# Patient Record
Sex: FEMALE | Race: BLACK OR AFRICAN AMERICAN | NOT HISPANIC OR LATINO | Employment: UNEMPLOYED | URBAN - METROPOLITAN AREA
[De-identification: names, ages, dates, MRNs, and addresses within clinical notes are randomized per-mention and may not be internally consistent; named-entity substitution may affect disease eponyms.]

---

## 2022-12-16 ENCOUNTER — HOSPITAL ENCOUNTER (EMERGENCY)
Facility: CLINIC | Age: 32
Discharge: HOME | End: 2022-12-16
Attending: INTERNAL MEDICINE | Admitting: INTERNAL MEDICINE
Payer: COMMERCIAL

## 2022-12-16 VITALS
HEART RATE: 73 BPM | RESPIRATION RATE: 16 BRPM | SYSTOLIC BLOOD PRESSURE: 105 MMHG | OXYGEN SATURATION: 99 % | WEIGHT: 133.8 LBS | DIASTOLIC BLOOD PRESSURE: 75 MMHG | TEMPERATURE: 98.1 F

## 2022-12-16 DIAGNOSIS — O26.91 COMPLICATION OF PREGNANCY, ANTEPARTUM, FIRST TRIMESTER: ICD-10-CM

## 2022-12-16 DIAGNOSIS — R10.84 ABDOMINAL PAIN, GENERALIZED: ICD-10-CM

## 2022-12-16 LAB
ALBUMIN SERPL-MCNC: 3.8 G/DL (ref 3.4–5)
ALBUMIN UR-MCNC: NEGATIVE MG/DL
ALP SERPL-CCNC: 58 U/L (ref 40–150)
ALT SERPL W P-5'-P-CCNC: 16 U/L (ref 0–50)
ANION GAP SERPL CALCULATED.3IONS-SCNC: 6 MMOL/L (ref 3–14)
APPEARANCE UR: CLEAR
AST SERPL W P-5'-P-CCNC: 10 U/L (ref 0–45)
BACTERIA #/AREA URNS HPF: ABNORMAL /HPF
BASOPHILS # BLD AUTO: 0 10E3/UL (ref 0–0.2)
BASOPHILS NFR BLD AUTO: 1 %
BILIRUB SERPL-MCNC: 0.4 MG/DL (ref 0.2–1.3)
BILIRUB UR QL STRIP: NEGATIVE
BUN SERPL-MCNC: 5 MG/DL (ref 7–30)
CALCIUM SERPL-MCNC: 8.9 MG/DL (ref 8.5–10.1)
CHLORIDE BLD-SCNC: 108 MMOL/L (ref 94–109)
CO2 SERPL-SCNC: 23 MMOL/L (ref 20–32)
COLOR UR AUTO: ABNORMAL
CREAT SERPL-MCNC: 0.53 MG/DL (ref 0.52–1.04)
EOSINOPHIL # BLD AUTO: 0 10E3/UL (ref 0–0.7)
EOSINOPHIL NFR BLD AUTO: 1 %
ERYTHROCYTE [DISTWIDTH] IN BLOOD BY AUTOMATED COUNT: 11.9 % (ref 10–15)
GFR SERPL CREATININE-BSD FRML MDRD: >90 ML/MIN/1.73M2
GLUCOSE BLD-MCNC: 92 MG/DL (ref 70–99)
GLUCOSE UR STRIP-MCNC: NEGATIVE MG/DL
HCT VFR BLD AUTO: 40.6 % (ref 35–47)
HGB BLD-MCNC: 13.8 G/DL (ref 11.7–15.7)
HGB UR QL STRIP: NEGATIVE
HOLD SPECIMEN: NORMAL
HOLD SPECIMEN: NORMAL
IMM GRANULOCYTES # BLD: 0 10E3/UL
IMM GRANULOCYTES NFR BLD: 0 %
KETONES UR STRIP-MCNC: NEGATIVE MG/DL
LEUKOCYTE ESTERASE UR QL STRIP: NEGATIVE
LYMPHOCYTES # BLD AUTO: 2.1 10E3/UL (ref 0.8–5.3)
LYMPHOCYTES NFR BLD AUTO: 38 %
MAGNESIUM SERPL-MCNC: 2.2 MG/DL (ref 1.6–2.3)
MCH RBC QN AUTO: 31 PG (ref 26.5–33)
MCHC RBC AUTO-ENTMCNC: 34 G/DL (ref 31.5–36.5)
MCV RBC AUTO: 91 FL (ref 78–100)
MONOCYTES # BLD AUTO: 0.4 10E3/UL (ref 0–1.3)
MONOCYTES NFR BLD AUTO: 7 %
MUCOUS THREADS #/AREA URNS LPF: PRESENT /LPF
NEUTROPHILS # BLD AUTO: 3.1 10E3/UL (ref 1.6–8.3)
NEUTROPHILS NFR BLD AUTO: 53 %
NITRATE UR QL: NEGATIVE
NRBC # BLD AUTO: 0 10E3/UL
NRBC BLD AUTO-RTO: 0 /100
PH UR STRIP: 6 [PH] (ref 5–7)
PLATELET # BLD AUTO: 253 10E3/UL (ref 150–450)
POTASSIUM BLD-SCNC: 3.5 MMOL/L (ref 3.4–5.3)
PROT SERPL-MCNC: 7.5 G/DL (ref 6.8–8.8)
RBC # BLD AUTO: 4.45 10E6/UL (ref 3.8–5.2)
RBC URINE: 1 /HPF
SODIUM SERPL-SCNC: 137 MMOL/L (ref 133–144)
SP GR UR STRIP: 1.01 (ref 1–1.03)
SQUAMOUS EPITHELIAL: <1 /HPF
UROBILINOGEN UR STRIP-MCNC: NORMAL MG/DL
WBC # BLD AUTO: 5.6 10E3/UL (ref 4–11)
WBC URINE: 1 /HPF

## 2022-12-16 PROCEDURE — 96361 HYDRATE IV INFUSION ADD-ON: CPT | Performed by: INTERNAL MEDICINE

## 2022-12-16 PROCEDURE — 99284 EMERGENCY DEPT VISIT MOD MDM: CPT | Mod: 25 | Performed by: INTERNAL MEDICINE

## 2022-12-16 PROCEDURE — 36415 COLL VENOUS BLD VENIPUNCTURE: CPT | Performed by: INTERNAL MEDICINE

## 2022-12-16 PROCEDURE — 81001 URINALYSIS AUTO W/SCOPE: CPT | Performed by: INTERNAL MEDICINE

## 2022-12-16 PROCEDURE — 258N000003 HC RX IP 258 OP 636: Performed by: INTERNAL MEDICINE

## 2022-12-16 PROCEDURE — 80053 COMPREHEN METABOLIC PANEL: CPT | Performed by: INTERNAL MEDICINE

## 2022-12-16 PROCEDURE — 96360 HYDRATION IV INFUSION INIT: CPT | Performed by: INTERNAL MEDICINE

## 2022-12-16 PROCEDURE — 85014 HEMATOCRIT: CPT | Performed by: INTERNAL MEDICINE

## 2022-12-16 PROCEDURE — 76815 OB US LIMITED FETUS(S): CPT | Performed by: INTERNAL MEDICINE

## 2022-12-16 PROCEDURE — 76815 OB US LIMITED FETUS(S): CPT | Mod: 26 | Performed by: INTERNAL MEDICINE

## 2022-12-16 PROCEDURE — 83735 ASSAY OF MAGNESIUM: CPT | Performed by: INTERNAL MEDICINE

## 2022-12-16 RX ORDER — SODIUM CHLORIDE 9 MG/ML
INJECTION, SOLUTION INTRAVENOUS CONTINUOUS
Status: DISCONTINUED | OUTPATIENT
Start: 2022-12-16 | End: 2022-12-16 | Stop reason: HOSPADM

## 2022-12-16 RX ORDER — ONDANSETRON 4 MG/1
4 TABLET, ORALLY DISINTEGRATING ORAL EVERY 6 HOURS PRN
Qty: 10 TABLET | Refills: 0 | Status: SHIPPED | OUTPATIENT
Start: 2022-12-16 | End: 2022-12-19

## 2022-12-16 RX ORDER — ACETAMINOPHEN 325 MG/1
975 TABLET ORAL ONCE
Status: COMPLETED | OUTPATIENT
Start: 2022-12-16 | End: 2022-12-16

## 2022-12-16 RX ORDER — SODIUM CHLORIDE 9 MG/ML
INJECTION, SOLUTION INTRAVENOUS
Status: DISCONTINUED
Start: 2022-12-16 | End: 2022-12-16 | Stop reason: HOSPADM

## 2022-12-16 RX ADMIN — SODIUM CHLORIDE 1000 ML: 9 INJECTION, SOLUTION INTRAVENOUS at 12:45

## 2022-12-16 RX ADMIN — SODIUM CHLORIDE: 9 INJECTION, SOLUTION INTRAVENOUS at 14:01

## 2022-12-16 ASSESSMENT — ACTIVITIES OF DAILY LIVING (ADL)
ADLS_ACUITY_SCORE: 35
ADLS_ACUITY_SCORE: 35
ADLS_ACUITY_SCORE: 33

## 2022-12-16 NOTE — ED TRIAGE NOTES
pt states she has a little headache/dizzy and stomach pain.  Pt states she recently found out she is pregnancy. LMP: 11/3/2022. ([id number])  Pt took no OTC medicaions for discomfort.

## 2022-12-16 NOTE — ED PROVIDER NOTES
ED Provider Note  [facility]      History     Chief Complaint   Patient presents with     Headache     Abdominal Pain     HPI  Yusra Gonzales is a 32 year old female with no significant past medical history who presents ED with dizziness started from 9d ago and abdominal discomfort started 7d ago.  Dizziness, which is sometimes like world is spinning, and abdominal discomfort comes and goes randomly and there were no triggering/aggravating/alleviating factors. Pt reports that she is pregnant based on pregnancy test result from her home a week ago. LMP was 6 weeks ago and there were no recent vaginal bleeding/discharge. Pt said that dizziness and abdominal discomfort she have is similar to what she experienced when she was pregnant 4 years ago. At that time, she gave birth to her with [facility] and that is the only pregnancy she had before. She is currently not taking any medication.      Past Medical History  History reviewed. No pertinent past medical history.  History reviewed. No pertinent surgical history.  No current outpatient medications on file.    No Known Allergies  Family History  History reviewed. No pertinent family history.  Social History   Social History     Tobacco Use     Smoking status: Never     Smokeless tobacco: Never   Substance Use Topics     Alcohol use: Never     Drug use: Never        Past medical history, past surgical history, medications, allergies, family history, and social history were reviewed with the patient. No additional pertinent items.       Review of Systems  A complete review of systems was performed with pertinent positives and negatives noted in the HPI, and all other systems negative.    Physical Exam   BP: 103/72  Pulse: 71  Temp: 97.9  F (36.6  C)  Resp: 12  Weight: 60.7 kg (133 lb 12.8 oz)  SpO2: 100 %  Physical Exam  HENT:      Head: Normocephalic.   Eyes:      Extraocular Movements: Extraocular movements intact.      Pupils: Pupils are equal,  round, and reactive to light.   Cardiovascular:      Rate and Rhythm: Normal rate and regular rhythm.      Heart sounds: Normal heart sounds.   Pulmonary:      Effort: Pulmonary effort is normal.      Breath sounds: Normal breath sounds.   Abdominal:      General: Abdomen is flat. Bowel sounds are normal.      Palpations: Abdomen is soft.      Comments: Mild suprapubic discomfort by pressing   Neurological:      General: No focal deficit present.      Mental Status: She is alert and oriented to person, place, and time.       ED Course     ED Course as of 12/16/22 1321   Fri Dec 16, 2022   1247 POC US OB TRANSABDOMINAL LIMITED   [address] [place] [address]   1304 POC US OB TRANSABDOMINAL LIMITED     Procedures  Results for orders placed during the hospital encounter of 12/16/22    POC US OB TRANSABDOMINAL LIMITED    Impression  Limited Bedside Transabdominal ultrasound for evaluation of IUP  Performed any interpreted by me.    Indication:  Nausea  Findings:  The lower abdomen was interrogated with a curvilinear probe. The uterus was identified.  Within the uterus there is a gestational sac    Impression: Intrauterine pregnancy                     Results for orders placed or performed during the hospital encounter of 12/16/22   POC US OB TRANSABDOMINAL LIMITED     Status: None    Impression    Limited Bedside Transabdominal ultrasound for evaluation of IUP        Performed any interpreted by me.    Indication:  Nausea  Findings:  The lower abdomen was interrogated with a curvilinear probe. The uterus was identified.   Within the uterus there is a gestational sac    Impression: Intrauterine pregnancy   CBC with platelets and differential     Status: None   Result Value Ref Range    WBC Count 5.6 4.0 - 11.0 10e3/uL    RBC Count 4.45 3.80 - 5.20 10e6/uL    Hemoglobin 13.8 11.7 - 15.7 g/dL    Hematocrit 40.6 35.0 - 47.0 %    MCV 91 78 - 100 fL    MCH 31.0 26.5 - 33.0 pg    MCHC 34.0 31.5 - 36.5 g/dL    RDW 11.9 10.0 - 15.0 %     Platelet Count 253 150 - 450 10e3/uL    % Neutrophils 53 %    % Lymphocytes 38 %    % Monocytes 7 %    % Eosinophils 1 %    % Basophils 1 %    % Immature Granulocytes 0 %    NRBCs per 100 WBC 0 <1 /100    Absolute Neutrophils 3.1 1.6 - 8.3 10e3/uL    Absolute Lymphocytes 2.1 0.8 - 5.3 10e3/uL    Absolute Monocytes 0.4 0.0 - 1.3 10e3/uL    Absolute Eosinophils 0.0 0.0 - 0.7 10e3/uL    Absolute Basophils 0.0 0.0 - 0.2 10e3/uL    Absolute Immature Granulocytes 0.0 <=0.4 10e3/uL    Absolute NRBCs 0.0 10e3/uL   [place] Draw     Status: None (In process)    Narrative    The following orders were created for panel order [place] Draw.  Procedure                               Abnormality         Status                     ---------                               -----------         ------                     Extra Blue Top Tube[[id number]]                              In process                 Extra Red Top Tube[[id number]]                               In process                   Please view results for these tests on the individual orders.   CBC with platelets differential     Status: None    Narrative    The following orders were created for panel order CBC with platelets differential.  Procedure                               Abnormality         Status                     ---------                               -----------         ------                     CBC with platelets and d...[[id number]]                      Final result                 Please view results for these tests on the individual orders.     Medications   0.9% sodium chloride BOLUS (1,000 mLs Intravenous New Bag 12/16/22 1245)     Followed by   sodium chloride 0.9% infusion (has no administration in time range)   acetaminophen (TYLENOL) tablet 975 mg (has no administration in time range)        Assessments & Plan (with Medical Decision Making)   Yusra Gonzales is a 32 year old female with no significant past medical history who presents ED with dizziness  and abdominal discomfort which is similar to the symptom she experienced in previous pregnancy. She had positive pregnancy test a week ago and LMP was 6 weeks. Her presentation is consistent with pregnancy. Differential diagnosis might include ectopic pregnancy and gastroenteritis. Urine test and Fetal US with appropriate hydration would be required.    I have reviewed the nursing notes. I have reviewed the findings, diagnosis, plan and need for follow up with the patient.    New Prescriptions    No medications on file       Final diagnoses:   None     Forrest Xuan  PGY-1 psychiatry resident  --  --    ED Attending Physician Attestation    I Amee Medina MD, MD, cared for this patient with the Resident. I have performed a history and physical examination of the patient and discussed management with the resident. I reviewed the resident's documentation above and agree with the documented findings and plan of care.    Summary of HPI, PE, ED Course   Patient is a 32 year old female evaluated in the emergency department for HA, low abd pain. Exam notable for no vag bleed, POCUS with IUP GS, labs and urine ok. ED course notable for improving with IVF. After the completion of care in the emergency department, the patient was discharged.    Critical Care & Procedures  Not applicable.    Medical Decision Making  The medical record was reviewed and interpreted.  Current labs reviewed and interpreted.  Current images reviewed and interpreted: POCUS with GS in uterus-IUP.      Amee Medina MD, MD  Emergency Medicine      Amee Medina Md  [facility] EMERGENCY DEPARTMENT  12/16/2022     Amee Medina MD  12/16/22 6986

## 2022-12-16 NOTE — DISCHARGE INSTRUCTIONS
Please use tylenol as needed and push fluids, use zofran as needed for nausea and make an appointment to follow up with [facility] (phone: [phone]) and OB/Gyn - [facility] (phone: [phone]) as soon as possible even if entirely better for prenatal care.

## 2023-01-18 ENCOUNTER — HOSPITAL ENCOUNTER (OUTPATIENT)
Dept: ULTRASOUND IMAGING | Facility: CLINIC | Age: 33
Discharge: HOME | End: 2023-01-18
Attending: ADVANCED PRACTICE MIDWIFE | Admitting: ADVANCED PRACTICE MIDWIFE
Payer: COMMERCIAL

## 2023-01-18 DIAGNOSIS — O09.90 HIGH RISK PREGNANCY, ANTEPARTUM: ICD-10-CM

## 2023-01-18 PROCEDURE — 76801 OB US < 14 WKS SINGLE FETUS: CPT

## 2023-01-18 PROCEDURE — 76801 OB US < 14 WKS SINGLE FETUS: CPT | Mod: 26 | Performed by: RADIOLOGY

## 2023-02-10 ENCOUNTER — TELEPHONE (OUTPATIENT)
Dept: OPHTHALMOLOGY | Facility: CLINIC | Age: 33
End: 2023-02-10
Payer: MEDICAID

## 2023-02-10 ENCOUNTER — HOSPITAL ENCOUNTER (EMERGENCY)
Facility: CLINIC | Age: 33
Discharge: HOME | End: 2023-02-10
Attending: EMERGENCY MEDICINE | Admitting: EMERGENCY MEDICINE
Payer: COMMERCIAL

## 2023-02-10 VITALS
HEART RATE: 89 BPM | RESPIRATION RATE: 16 BRPM | DIASTOLIC BLOOD PRESSURE: 57 MMHG | OXYGEN SATURATION: 98 % | SYSTOLIC BLOOD PRESSURE: 97 MMHG | TEMPERATURE: 99 F

## 2023-02-10 DIAGNOSIS — H04.123 DRY EYES: ICD-10-CM

## 2023-02-10 PROCEDURE — 99283 EMERGENCY DEPT VISIT LOW MDM: CPT | Performed by: EMERGENCY MEDICINE

## 2023-02-10 PROCEDURE — 99284 EMERGENCY DEPT VISIT MOD MDM: CPT | Performed by: EMERGENCY MEDICINE

## 2023-02-10 RX ORDER — CARBOXYMETHYLCELLULOSE SODIUM 5 MG/ML
1 SOLUTION/ DROPS OPHTHALMIC 3 TIMES DAILY PRN
Qty: 50 EACH | Refills: 0 | Status: ON HOLD | OUTPATIENT
Start: 2023-02-10 | End: 2023-08-12

## 2023-02-10 RX ORDER — PRENATAL VIT/IRON FUM/FOLIC AC 27MG-0.8MG
1 TABLET ORAL
Status: ON HOLD | COMMUNITY
Start: 2023-01-08 | End: 2023-08-12

## 2023-02-10 ASSESSMENT — VISUAL ACUITY
OD: 20/30
OS: 20/40

## 2023-02-10 ASSESSMENT — ACTIVITIES OF DAILY LIVING (ADL): ADLS_ACUITY_SCORE: 35

## 2023-02-10 NOTE — ED TRIAGE NOTES
Pt reporting extreme sensitivity to light for 1 month. Pt states this happing in the past, but usually resolves. Pt also endorsing eye dryness. hx laser eye surgery in [place] in 2018. Pt is 14wks pregnant- states no concerns for her pregnancy at this time      Triage Assessment     Row Name 02/10/23 115       Triage Assessment (Adult)    Airway WDL WDL       Respiratory WDL    Respiratory WDL WDL       Skin Circulation/Temperature WDL    Skin Circulation/Temperature WDL WDL       Cardiac WDL    Cardiac WDL WDL       Peripheral/Neurovascular WDL    Peripheral Neurovascular WDL WDL       Cognitive/Neuro/Behavioral WDL    Cognitive/Neuro/Behavioral WDL WDL

## 2023-02-10 NOTE — DISCHARGE INSTRUCTIONS
Please make an appointment to follow up with [facility] Clinic (phone: [phone]) in 1-2 weeks .  Use refresh eyedrops until follow-up.

## 2023-02-10 NOTE — ED PROVIDER NOTES
[facility] EMERGENCY DEPARTMENT ([facility])   ED PROVIDER NOTE  February 10, 2023  Sarah RODRIGUEZ  12:32 PM   History     Chief Complaint   Patient presents with     Eye Problem     Pt reporting extreme sensitivity to light for 1 month. Pt states this happing in the past, but usually resolves. Pt also endorsing eye dryness. hx laser eye surgery in [place] in 2018.     The history is provided by the patient and medical records.     Yusra Gonzales is a 33 year old female who presents with severe light sensitivity for the past month. Patient has had photophobia previously, as she has had laser eye surgery performed [place] in August 2018 in [facility] for shortsightedness. Se states her vision was much more clear afterwards. However her light sensitivity has been worsening  and for a more protracted period of time, especially after moving here 1 month ago.  Her eyes are very sensitive to the light especially when being reflected by the snow. She has eye dryness with this. She was using eyedrops in the past but finished them 2 months ago.  The patient is interested in obtaining prescription sunglasses.  Social: Here with a friend      Past Medical History  No past medical history on file.  No past surgical history on file.  carboxymethylcellulose PF (CARBOXYMETHYLCELLULOSE SODIUM) 0.5 % ophthalmic solution  Prenatal Vit-Fe Fumarate-FA (PRENATAL MULTIVITAMIN W/IRON) 27-0.8 MG tablet      No Known Allergies  Family History  No family history on file.  Social History   Social History     Tobacco Use     Smoking status: Never     Smokeless tobacco: Never   Substance Use Topics     Alcohol use: Never     Drug use: Never         A medically appropriate review of systems was performed with pertinent positives and negatives noted in the HPI, and all other systems negative.      Physical Exam   BP: 97/57  Pulse: 89  Temp: 99  F (37.2  C)  Resp: 16  SpO2: 98 %      Physical Exam  Vitals and nursing note reviewed.    Constitutional:       Appearance: Normal appearance.   HENT:      Head: Normocephalic.   Cardiovascular:      Rate and Rhythm: Normal rate and regular rhythm.   Pulmonary:      Effort: Pulmonary effort is normal.      Breath sounds: Normal breath sounds.   Neurological:      Mental Status: She is alert and oriented to person, place, and time.   Psychiatric:         Mood and Affect: Mood normal.     EYEs: Pupils equal round and reactive to light ,EOMI,  Visual acuity: Right: 20/30 : Left 20/40    ED Course     ED Course as of 02/10/23 1452   Fri Feb 10, 2023   1235 Case discussed with Dr. Benavides of Ophthalmology    1237 Dr. Benavides evaluated patient with scope.      Procedures             No results found for this or any previous visit (from the past 24 hour(s)).  Medications - No data to display          Medical Decision Making  The patient presented with a problem that is a clearly self-limited or minor problem.    The patient's evaluation involved:  discussion of management or test interpretation with another health professional (Ophthalmology)    The patient's management involved only low risk treatment.     Assessments & Plan (with Medical Decision Making)       I have reviewed the nursing notes.  EMERGENCY DEPARTMENT COURSE: Patient was seen and examined at 1229 pm in surge.     The patient's visual acuity is 20/30 in the right eye and 20/40 in the left eye.    She was seen briefly by Dr. Benavides of ophthalmology who used a hand-held slit lamp to evaluate the patient's eyes.  He sees no areas of concern, no erythema, flare or infection.  He recommends discharging the patient with refresh eyedrops and follow-up in the eye clinic.    The patient requested prescription sunglasses.  I told her the ER is not the appropriate place to obtain prescription sunglasses.  She will need to follow-up with ophthalmology or with an optometrist.          I have reviewed the findings, diagnosis, plan and need for follow up with the  patient.    Discharge Medication List as of 2/10/2023  1:26 PM      START taking these medications    Details   carboxymethylcellulose PF (CARBOXYMETHYLCELLULOSE SODIUM) 0.5 % ophthalmic solution Place 1 drop into both eyes 3 times daily as needed for dry eyes, Disp-50 each, R-0, Local Print             Final diagnoses:   Dry eyes     I, Nubia Mello, am serving as a trained medical scribe to document services personally performed by Radha Malin MD based on the provider's statements to me on February 10, 2023.  This document has been checked and approved by the attending provider.    I, Radha Malin MD, was physically present and have reviewed and verified the accuracy of this note documented by Nubia Mello, medical scribe.    This note was created in part by the use of Dragon voice recognition dictation system. Inadvertent grammatical errors and typographical errors may still exist.  MD Radha Benavides MD       2/10/2023   [facility] EMERGENCY DEPARTMENT     Radha Malin MD  02/10/23 1450

## 2023-02-14 ENCOUNTER — TELEPHONE (OUTPATIENT)
Dept: OPHTHALMOLOGY | Facility: CLINIC | Age: 33
End: 2023-02-14
Payer: MEDICAID

## 2023-02-14 NOTE — TELEPHONE ENCOUNTER
Called and spoke to Yusra     Made an appointment for 3/3 @ 930 am with Dr. Lainez     -     Please mail out new pt packet     Niesha Benjamin Communication Facilitator on 2/14/2023 at 9:32 AM

## 2023-02-14 NOTE — TELEPHONE ENCOUNTER
Ok to establish care with general ophthalmology non-urgently per Dr. Benavides    H/o Dry Eyes/LASIK    Note to patient communicator to reach out for scheduling    Ilan New RN 9:22 AM 02/14/23

## 2023-03-02 ENCOUNTER — TRANSFERRED RECORDS (OUTPATIENT)
Dept: HEALTH INFORMATION MANAGEMENT | Facility: CLINIC | Age: 33
End: 2023-03-02

## 2023-03-02 ENCOUNTER — MEDICAL CORRESPONDENCE (OUTPATIENT)
Dept: HEALTH INFORMATION MANAGEMENT | Facility: CLINIC | Age: 33
End: 2023-03-02
Payer: MEDICAID

## 2023-03-02 PROCEDURE — 87624 HPV HI-RISK TYP POOLED RSLT: CPT | Mod: ORL | Performed by: ADVANCED PRACTICE MIDWIFE

## 2023-03-03 ENCOUNTER — OFFICE VISIT (OUTPATIENT)
Dept: OPHTHALMOLOGY | Facility: CLINIC | Age: 33
End: 2023-03-03
Attending: OPHTHALMOLOGY
Payer: COMMERCIAL

## 2023-03-03 DIAGNOSIS — Z98.890 HX OF LASIK: ICD-10-CM

## 2023-03-03 DIAGNOSIS — H02.886 MEIBOMIAN GLAND DYSFUNCTION (MGD) OF BOTH EYES: ICD-10-CM

## 2023-03-03 DIAGNOSIS — H52.13 MYOPIA OF BOTH EYES WITH ASTIGMATISM: ICD-10-CM

## 2023-03-03 DIAGNOSIS — H52.203 MYOPIA OF BOTH EYES WITH ASTIGMATISM: ICD-10-CM

## 2023-03-03 DIAGNOSIS — H04.123 BILATERAL DRY EYES: Primary | ICD-10-CM

## 2023-03-03 DIAGNOSIS — H02.883 MEIBOMIAN GLAND DYSFUNCTION (MGD) OF BOTH EYES: ICD-10-CM

## 2023-03-03 PROCEDURE — 92002 INTRM OPH EXAM NEW PATIENT: CPT | Mod: GC | Performed by: STUDENT IN AN ORGANIZED HEALTH CARE EDUCATION/TRAINING PROGRAM

## 2023-03-03 PROCEDURE — 92015 DETERMINE REFRACTIVE STATE: CPT

## 2023-03-03 PROCEDURE — G0463 HOSPITAL OUTPT CLINIC VISIT: HCPCS

## 2023-03-03 RX ORDER — CARBOXYMETHYLCELLULOSE SODIUM 5 MG/ML
1 SOLUTION/ DROPS OPHTHALMIC 4 TIMES DAILY
Qty: 30 ML | Refills: 11 | Status: ON HOLD | OUTPATIENT
Start: 2023-03-03 | End: 2023-08-12

## 2023-03-03 ASSESSMENT — VISUAL ACUITY
OD_SC: 20/20
OS_SC+: -1
METHOD: SNELLEN - LINEAR
OS_SC: 20/30
OD_SC+: -2

## 2023-03-03 ASSESSMENT — CUP TO DISC RATIO
OD_RATIO: 0.3
OS_RATIO: 0.3

## 2023-03-03 ASSESSMENT — CONF VISUAL FIELD
OS_NORMAL: 1
OD_SUPERIOR_TEMPORAL_RESTRICTION: 0
OS_SUPERIOR_NASAL_RESTRICTION: 0
OD_NORMAL: 1
OS_INFERIOR_NASAL_RESTRICTION: 0
OD_INFERIOR_TEMPORAL_RESTRICTION: 0
OD_SUPERIOR_NASAL_RESTRICTION: 0
OD_INFERIOR_NASAL_RESTRICTION: 0
OS_INFERIOR_TEMPORAL_RESTRICTION: 0
METHOD: COUNTING FINGERS
OS_SUPERIOR_TEMPORAL_RESTRICTION: 0

## 2023-03-03 ASSESSMENT — EXTERNAL EXAM - LEFT EYE: OS_EXAM: WNL

## 2023-03-03 ASSESSMENT — TONOMETRY
IOP_METHOD: TONOPEN
OS_IOP_MMHG: 14
OD_IOP_MMHG: 17

## 2023-03-03 ASSESSMENT — SLIT LAMP EXAM - LIDS
COMMENTS: MGD
COMMENTS: MGD

## 2023-03-03 ASSESSMENT — REFRACTION_MANIFEST
OD_AXIS: 180
OD_CYLINDER: +1.00
OS_CYLINDER: +1.25
OD_SPHERE: -0.75
OS_AXIS: 180
OS_SPHERE: -1.00

## 2023-03-03 ASSESSMENT — EXTERNAL EXAM - RIGHT EYE: OD_EXAM: WNL

## 2023-03-03 NOTE — PROGRESS NOTES
HPI    Patient states that her distance vision is blurry. Near vision is well. Patient states that her eyes are dry. She states that she use to use drops, but has not been using them. No pain and irritation. No flashes of lights. No floaters.     Ocular Meds:none     Tim Obando COT, March 3, 2023 9:39 AM        Last edited by Tim Obando on 3/3/2023  9:41 AM.          Review of systems for the eyes was negative other than the pertinent positives/negatives listed in the HPI.    Ocular Meds: none    Ocular Hx: refractive error OU, LASIK OU (2019), dry eyes OU    FOHx: no significant family ocular history including no glaucoma or blindness    PMHx: none    Assessment & Plan         Yusra Gonzales is a 33 year old female with the following diagnoses:    1. Bilateral dry eyes    2. Hx of LASIK    3. Meibomian gland dysfunction (MGD) of both eyes    4. Myopia of both eyes with astigmatism      Excellent BCVA, here for eval of dry eyes  Refraction reviewed patient happy with current vision at this time and would like to trial ATs prior to obtain Rx for glasses  Start PFATs QID and PRN OU (samples provided of refresh PF and systane PF)  Start warm compresses BID OU x 5-10 min each time    Counseled return precautions    Patient disposition:   Return in about 1 year (around 3/3/2024) for Annual Visit, or sooner changes.    Anirudh Romero MD  Resident Physician  [facility]    Attending Physician Attestation:  Complete documentation of historical and exam elements from today's encounter can be found in the full encounter summary report (not reduplicated in this progress note).  I personally obtained the chief complaint(s) and history of present illness.  I confirmed and edited as necessary the review of systems, past medical/surgical history, family history, social history, and examination findings as documented by others; and I examined the patient myself.  I personally reviewed the relevant tests, images, and  reports as documented above.  I formulated and edited as necessary the assessment and plan and discussed the findings and management plan with the patient and family. . - Kristina Lainez MD

## 2023-03-03 NOTE — NURSING NOTE
Chief Complaints and History of Present Illnesses   Patient presents with     COMPREHENSIVE EYE EXAM     Chief Complaint(s) and History of Present Illness(es)     COMPREHENSIVE EYE EXAM           Comments    Patient states that her distance vision is blurry. Near vision is well. Patient states that her eyes are dry. She states that she use to use drops, but has not been using them. No pain and irritation. No flashes of lights. No floaters.     Ocular Meds:none     Tim STARKEY, March 3, 2023 9:39 AM

## 2023-03-03 NOTE — PATIENT INSTRUCTIONS
Use preservative free artificial tears one drop four times a day and as needed for comfort to both eyes; some brands include: refresh, systane, thera tears, blink, etc    DO NOT use eye drops that say 'take the red out' including Visine or Clear Eyes    Do warm compresses at least two times a day to both eyelids for 5-10 min each time

## 2023-03-06 ENCOUNTER — LAB REQUISITION (OUTPATIENT)
Dept: LAB | Facility: CLINIC | Age: 33
End: 2023-03-06
Payer: COMMERCIAL

## 2023-03-06 DIAGNOSIS — Z11.51 ENCOUNTER FOR SCREENING FOR HUMAN PAPILLOMAVIRUS (HPV): ICD-10-CM

## 2023-03-07 ENCOUNTER — TRANSCRIBE ORDERS (OUTPATIENT)
Dept: MATERNAL FETAL MEDICINE | Facility: CLINIC | Age: 33
End: 2023-03-07
Payer: MEDICAID

## 2023-03-07 DIAGNOSIS — O26.90 PREGNANCY RELATED CONDITION, ANTEPARTUM: Primary | ICD-10-CM

## 2023-03-08 LAB
HUMAN PAPILLOMA VIRUS 16 DNA: NEGATIVE
HUMAN PAPILLOMA VIRUS 18 DNA: NEGATIVE
HUMAN PAPILLOMA VIRUS FINAL DIAGNOSIS: ABNORMAL
HUMAN PAPILLOMA VIRUS OTHER HR: POSITIVE

## 2023-03-17 ENCOUNTER — PRE VISIT (OUTPATIENT)
Dept: MATERNAL FETAL MEDICINE | Facility: CLINIC | Age: 33
End: 2023-03-17
Payer: MEDICAID

## 2023-03-17 ENCOUNTER — HOSPITAL ENCOUNTER (EMERGENCY)
Facility: CLINIC | Age: 33
Discharge: HOME | End: 2023-03-17
Attending: EMERGENCY MEDICINE | Admitting: EMERGENCY MEDICINE
Payer: COMMERCIAL

## 2023-03-17 VITALS
OXYGEN SATURATION: 99 % | BODY MASS INDEX: 19.88 KG/M2 | WEIGHT: 131.2 LBS | DIASTOLIC BLOOD PRESSURE: 60 MMHG | HEART RATE: 77 BPM | HEIGHT: 68 IN | SYSTOLIC BLOOD PRESSURE: 91 MMHG | TEMPERATURE: 98.3 F | RESPIRATION RATE: 16 BRPM

## 2023-03-17 DIAGNOSIS — N89.8 VAGINAL ITCHING: ICD-10-CM

## 2023-03-17 LAB
ALBUMIN UR-MCNC: NEGATIVE MG/DL
APPEARANCE UR: CLEAR
BACTERIA #/AREA URNS HPF: ABNORMAL /HPF
BILIRUB UR QL STRIP: NEGATIVE
CLUE CELLS: NORMAL
COLOR UR AUTO: ABNORMAL
GLUCOSE UR STRIP-MCNC: NEGATIVE MG/DL
HGB UR QL STRIP: NEGATIVE
KETONES UR STRIP-MCNC: NEGATIVE MG/DL
LEUKOCYTE ESTERASE UR QL STRIP: ABNORMAL
NITRATE UR QL: NEGATIVE
PH UR STRIP: 6 [PH] (ref 5–7)
RBC URINE: 5 /HPF
SP GR UR STRIP: 1 (ref 1–1.03)
SQUAMOUS EPITHELIAL: <1 /HPF
TRICHOMONAS, WET PREP: NORMAL
UROBILINOGEN UR STRIP-MCNC: NORMAL MG/DL
WBC URINE: 4 /HPF
WBC'S/HIGH POWER FIELD, WET PREP: NORMAL
YEAST, WET PREP: NORMAL

## 2023-03-17 PROCEDURE — 87210 SMEAR WET MOUNT SALINE/INK: CPT | Performed by: EMERGENCY MEDICINE

## 2023-03-17 PROCEDURE — 87086 URINE CULTURE/COLONY COUNT: CPT | Performed by: EMERGENCY MEDICINE

## 2023-03-17 PROCEDURE — 99284 EMERGENCY DEPT VISIT MOD MDM: CPT | Performed by: EMERGENCY MEDICINE

## 2023-03-17 PROCEDURE — 87491 CHLMYD TRACH DNA AMP PROBE: CPT | Performed by: EMERGENCY MEDICINE

## 2023-03-17 PROCEDURE — 81001 URINALYSIS AUTO W/SCOPE: CPT | Performed by: EMERGENCY MEDICINE

## 2023-03-17 RX ORDER — CEFDINIR 300 MG/1
300 CAPSULE ORAL 2 TIMES DAILY
Qty: 14 CAPSULE | Refills: 0 | Status: SHIPPED | OUTPATIENT
Start: 2023-03-17 | End: 2023-03-24

## 2023-03-17 ASSESSMENT — ACTIVITIES OF DAILY LIVING (ADL)
ADLS_ACUITY_SCORE: 35
ADLS_ACUITY_SCORE: 35

## 2023-03-17 NOTE — DISCHARGE INSTRUCTIONS
I was happy to see that your swabs are negative for signs of infection.  Your urinalysis does demonstrate large amount of white blood cells the cells that fight infection.  Standard of care is to send this off and put the sample on a Petri dish to look for growth which is called a culture.  We will monitor this.  However at this time I would start you on an antibiotic for 7 days.  Should symptoms persist I would want you to check back in with your primary care physician for reevaluation.

## 2023-03-17 NOTE — ED PROVIDER NOTES
"ED Provider Note  [facility]      History     Chief Complaint   Patient presents with     Vaginal Itching     Has been having vaginal itchiness x 2 weeks, per pt her PMD is not available thus she is in the ED. AOG 20 weeks     Pregnancy Complications     Vaginal itching, 20 wks pregnant     HPI  Yusra Gonzales is a 33 year old female who arrives today to the emergency department valuation of vaginal itching x2 weeks.  This patient reports primarily externally.  She denies note of drainage, malodorous drainage or bleeding.  She reports no abdominal pain.  Patient reports otherwise she is currently pregnant and has had no concerns regarding pregnancy at this time.  She denies similar symptoms in the past.  She denies fever, chills, dysuria, urinary frequency.    Past Medical History  No past medical history on file.  Past Surgical History:   Procedure Laterality Date     LASIK BILATERAL  2018     cefdinir (OMNICEF) 300 MG capsule  carboxymethylcellulose (REFRESH PLUS) 0.5 % SOLN ophthalmic solution  carboxymethylcellulose PF (CARBOXYMETHYLCELLULOSE SODIUM) 0.5 % ophthalmic solution  Prenatal Vit-Fe Fumarate-FA (PRENATAL MULTIVITAMIN W/IRON) 27-0.8 MG tablet      No Known Allergies  Family History  No family history on file.  Social History   Social History     Tobacco Use     Smoking status: Never     Smokeless tobacco: Never   Substance Use Topics     Alcohol use: Never     Drug use: Never      Past medical history, past surgical history, medications, allergies, family history, and social history were reviewed with the patient. No additional pertinent items.      A medically appropriate review of systems was performed with pertinent positives and negatives noted in the HPI, and all other systems negative.    Physical Exam   BP: 91/60  Pulse: 77  Temp: 98.3  F (36.8  C)  Resp: 16  Height: 173 cm (5' 8.11\")  Weight: 59.5 kg (131 lb 3.2 oz)  SpO2: 99 %  Physical Exam  Vitals and nursing note " reviewed.   Constitutional:       General: She is not in acute distress.     Appearance: She is not ill-appearing or diaphoretic.   Cardiovascular:      Rate and Rhythm: Normal rate.   Pulmonary:      Effort: Pulmonary effort is normal. No respiratory distress.   Abdominal:      General: Abdomen is flat.      Tenderness: There is no abdominal tenderness. There is no guarding.   Skin:     Findings: No lesion or rash.   Neurological:      General: No focal deficit present.      Mental Status: She is alert.   Psychiatric:         Mood and Affect: Mood normal.         Behavior: Behavior normal.         ED Course, Procedures, & Data      Procedures                     Results for orders placed or performed during the hospital encounter of 03/17/23   UA with Microscopic reflex to Culture     Status: Abnormal    Specimen: Urine, Clean Catch   Result Value Ref Range    Color Urine Straw Colorless, Straw, Light Yellow, Yellow    Appearance Urine Clear Clear    Glucose Urine Negative Negative mg/dL    Bilirubin Urine Negative Negative    Ketones Urine Negative Negative mg/dL    Specific Gravity Urine 1.004 1.003 - 1.035    Blood Urine Negative Negative    pH Urine 6.0 5.0 - 7.0    Protein Albumin Urine Negative Negative mg/dL    Urobilinogen Urine Normal Normal, 2.0 mg/dL    Nitrite Urine Negative Negative    Leukocyte Esterase Urine Large (A) Negative    Bacteria Urine Few (A) None Seen /HPF    RBC Urine 5 (H) <=2 /HPF    WBC Urine 4 <=5 /HPF    Squamous Epithelials Urine <1 <=1 /HPF    Narrative    Urine Culture ordered based on laboratory criteria   Wet prep     Status: Normal    Specimen: Vagina; Swab   Result Value Ref Range    Trichomonas Absent Absent    Yeast Absent Absent    Clue Cells Absent Absent    WBCs/high power field None None     Medications - No data to display  Labs Ordered and Resulted from Time of ED Arrival to Time of ED Departure   ROUTINE UA WITH MICROSCOPIC REFLEX TO CULTURE - Abnormal       Result  Value    Color Urine Straw      Appearance Urine Clear      Glucose Urine Negative      Bilirubin Urine Negative      Ketones Urine Negative      Specific Gravity Urine 1.004      Blood Urine Negative      pH Urine 6.0      Protein Albumin Urine Negative      Urobilinogen Urine Normal      Nitrite Urine Negative      Leukocyte Esterase Urine Large (*)     Bacteria Urine Few (*)     RBC Urine 5 (*)     WBC Urine 4      Squamous Epithelials Urine <1     WET PREPARATION - Normal    Trichomonas Absent      Yeast Absent      Clue Cells Absent      WBCs/high power field None     CHLAMYDIA TRACHOMATIS/NEISSERIA GONORRHOEAE BY PCR   URINE CULTURE     No orders to display              Assessment & Plan      Yusra Gonzales is a 33 year old female who arrives today to the emergency department valuation of vaginal itching x2 weeks.  On arrival patient noted be alert.  Presently afebrile and he medically stable.  She is seated upright in bed and appears to be nontoxic.  Abdominal examination benign.  Low suspicion for intra-abdominal pathology.  Low suspicion for UTI based on symptoms.  Discussed possibility of yeast infection, bacterial vaginosis.  Low suspicion for STI.  Plan for swab, UA.  External evaluation demonstrates no obvious lesions.  Pelvic examination with scant white discharge.  No obvious bleeding.  Wet prep negative.  UA positive for large leuk esterase.  Does not sound classic for UTI however with persistence of symptoms I would plan to treat and de-escalate based on culture.  She will monitor symptoms closely and follow-up with her PCP or OB should symptoms persist.  We are certainly happy to reevaluate this patient with any change, progression or worsening of symptoms.    I have reviewed the nursing notes. I have reviewed the findings, diagnosis, plan and need for follow up with the patient.    Discharge Medication List as of 3/17/2023  5:28 PM      START taking these medications    Details   cefdinir (OMNICEF)  300 MG capsule Take 1 capsule (300 mg) by mouth 2 times daily for 7 days, Disp-14 capsule, R-0, Local Print             Final diagnoses:   Vaginal itching       Manuel Kumari  formerly [facility] EMERGENCY DEPARTMENT  3/17/2023     Manuel Kumari MD  03/17/23 2362

## 2023-03-17 NOTE — ED TRIAGE NOTES
Has been having vaginal itchiness x 2 weeks, per pt her PMD is not available thus she is in the ED      Triage Assessment     Row Name 03/17/23 8396       Triage Assessment (Adult)    Airway WDL WDL       Respiratory WDL    Respiratory WDL WDL       Skin Circulation/Temperature WDL    Skin Circulation/Temperature WDL WDL       Cardiac WDL    Cardiac WDL WDL       Peripheral/Neurovascular WDL    Peripheral Neurovascular WDL WDL       Cognitive/Neuro/Behavioral WDL    Cognitive/Neuro/Behavioral WDL WDL

## 2023-03-18 LAB
C TRACH DNA SPEC QL PROBE+SIG AMP: NEGATIVE
N GONORRHOEA DNA SPEC QL NAA+PROBE: NEGATIVE

## 2023-03-19 LAB — BACTERIA UR CULT: NO GROWTH

## 2023-03-22 ENCOUNTER — HOSPITAL ENCOUNTER (OUTPATIENT)
Dept: ULTRASOUND IMAGING | Facility: CLINIC | Age: 33
Discharge: HOME | End: 2023-03-22
Attending: ADVANCED PRACTICE MIDWIFE
Payer: COMMERCIAL

## 2023-03-22 ENCOUNTER — OFFICE VISIT (OUTPATIENT)
Dept: MATERNAL FETAL MEDICINE | Facility: CLINIC | Age: 33
End: 2023-03-22
Attending: ADVANCED PRACTICE MIDWIFE
Payer: COMMERCIAL

## 2023-03-22 DIAGNOSIS — O26.90 PREGNANCY RELATED CONDITION, ANTEPARTUM: ICD-10-CM

## 2023-03-22 DIAGNOSIS — Z87.59 HISTORY OF IUFD: Primary | ICD-10-CM

## 2023-03-22 DIAGNOSIS — Z36.89 ENCOUNTER FOR ULTRASOUND TO ASSESS FETAL GROWTH: ICD-10-CM

## 2023-03-22 PROCEDURE — 99203 OFFICE O/P NEW LOW 30 MIN: CPT | Mod: 25 | Performed by: OBSTETRICS & GYNECOLOGY

## 2023-03-22 PROCEDURE — 76811 OB US DETAILED SNGL FETUS: CPT | Mod: 26 | Performed by: OBSTETRICS & GYNECOLOGY

## 2023-03-22 PROCEDURE — 76811 OB US DETAILED SNGL FETUS: CPT

## 2023-03-22 NOTE — NURSING NOTE
IPAD Avaz  used for MFM ultrasound and office visit.  Patient reports positive fetal movement, no pain, no contractions, leaking of fluid, or bleeding. SBAR given to MFM MD, see their note in Epic.

## 2023-03-22 NOTE — PROGRESS NOTES
Please see the imaging tab for details of the ultrasound performed today.    Leslie Mcgrath MD  Specialist in Maternal-Fetal Medicine

## 2023-04-25 ENCOUNTER — HOSPITAL ENCOUNTER (OUTPATIENT)
Facility: CLINIC | Age: 33
Discharge: HOME | End: 2023-04-26
Attending: OBSTETRICS & GYNECOLOGY | Admitting: OBSTETRICS & GYNECOLOGY
Payer: COMMERCIAL

## 2023-04-25 VITALS
BODY MASS INDEX: 21.07 KG/M2 | RESPIRATION RATE: 18 BRPM | TEMPERATURE: 97.6 F | OXYGEN SATURATION: 100 % | WEIGHT: 139 LBS | HEART RATE: 87 BPM | HEIGHT: 68 IN | SYSTOLIC BLOOD PRESSURE: 109 MMHG | DIASTOLIC BLOOD PRESSURE: 70 MMHG

## 2023-04-25 LAB
ALBUMIN SERPL BCG-MCNC: 3.4 G/DL (ref 3.5–5.2)
ALP SERPL-CCNC: 68 U/L (ref 35–104)
ALT SERPL W P-5'-P-CCNC: 14 U/L (ref 10–35)
AMYLASE SERPL-CCNC: 111 U/L (ref 28–100)
ANION GAP SERPL CALCULATED.3IONS-SCNC: 10 MMOL/L (ref 7–15)
AST SERPL W P-5'-P-CCNC: 19 U/L (ref 10–35)
BILIRUB SERPL-MCNC: <0.2 MG/DL
BUN SERPL-MCNC: 5.1 MG/DL (ref 6–20)
CALCIUM SERPL-MCNC: 8.7 MG/DL (ref 8.6–10)
CHLORIDE SERPL-SCNC: 103 MMOL/L (ref 98–107)
CREAT SERPL-MCNC: 0.38 MG/DL (ref 0.51–0.95)
DEPRECATED HCO3 PLAS-SCNC: 21 MMOL/L (ref 22–29)
ERYTHROCYTE [DISTWIDTH] IN BLOOD BY AUTOMATED COUNT: 12.9 % (ref 10–15)
GFR SERPL CREATININE-BSD FRML MDRD: >90 ML/MIN/1.73M2
GLUCOSE SERPL-MCNC: 117 MG/DL (ref 70–99)
HCT VFR BLD AUTO: 34.7 % (ref 35–47)
HGB BLD-MCNC: 11.7 G/DL (ref 11.7–15.7)
LIPASE SERPL-CCNC: 38 U/L (ref 13–60)
MCH RBC QN AUTO: 31.6 PG (ref 26.5–33)
MCHC RBC AUTO-ENTMCNC: 33.7 G/DL (ref 31.5–36.5)
MCV RBC AUTO: 94 FL (ref 78–100)
PLATELET # BLD AUTO: 194 10E3/UL (ref 150–450)
POTASSIUM SERPL-SCNC: 3.5 MMOL/L (ref 3.4–5.3)
PROT SERPL-MCNC: 6.3 G/DL (ref 6.4–8.3)
RBC # BLD AUTO: 3.7 10E6/UL (ref 3.8–5.2)
SODIUM SERPL-SCNC: 134 MMOL/L (ref 136–145)
WBC # BLD AUTO: 12.5 10E3/UL (ref 4–11)

## 2023-04-25 PROCEDURE — 80053 COMPREHEN METABOLIC PANEL: CPT

## 2023-04-25 PROCEDURE — 82150 ASSAY OF AMYLASE: CPT

## 2023-04-25 PROCEDURE — 85027 COMPLETE CBC AUTOMATED: CPT

## 2023-04-25 PROCEDURE — G0463 HOSPITAL OUTPT CLINIC VISIT: HCPCS | Mod: 25

## 2023-04-25 PROCEDURE — 83690 ASSAY OF LIPASE: CPT

## 2023-04-25 PROCEDURE — 999N000104 HC STATISTIC NO CHARGE

## 2023-04-25 PROCEDURE — 250N000009 HC RX 250

## 2023-04-25 PROCEDURE — 36415 COLL VENOUS BLD VENIPUNCTURE: CPT

## 2023-04-25 PROCEDURE — 250N000013 HC RX MED GY IP 250 OP 250 PS 637

## 2023-04-25 RX ORDER — ACETAMINOPHEN 325 MG/1
975 TABLET ORAL EVERY 4 HOURS PRN
Status: DISCONTINUED | OUTPATIENT
Start: 2023-04-25 | End: 2023-04-26

## 2023-04-25 RX ORDER — ONDANSETRON 2 MG/ML
4 INJECTION INTRAMUSCULAR; INTRAVENOUS EVERY 6 HOURS PRN
Status: DISCONTINUED | OUTPATIENT
Start: 2023-04-25 | End: 2023-04-26 | Stop reason: HOSPADM

## 2023-04-25 RX ORDER — VITAMIN B COMPLEX
1 TABLET ORAL DAILY
Status: ON HOLD | COMMUNITY
End: 2023-08-12

## 2023-04-25 RX ADMIN — ALUMINUM HYDROXIDE, MAGNESIUM HYDROXIDE, AND DIMETHICONE 30 ML: 200; 20; 200 SUSPENSION ORAL at 23:38

## 2023-04-25 ASSESSMENT — ACTIVITIES OF DAILY LIVING (ADL): ADLS_ACUITY_SCORE: 35

## 2023-04-26 ENCOUNTER — HOSPITAL ENCOUNTER (EMERGENCY)
Facility: CLINIC | Age: 33
End: 2023-04-26
Admitting: OBSTETRICS & GYNECOLOGY
Payer: COMMERCIAL

## 2023-04-26 LAB
HOLD SPECIMEN: NORMAL
HOLD SPECIMEN: NORMAL

## 2023-04-26 PROCEDURE — 999N000105 HC STATISTIC NO DOCUMENTATION TO SUPPORT CHARGE

## 2023-04-26 PROCEDURE — G0463 HOSPITAL OUTPT CLINIC VISIT: HCPCS | Mod: 25

## 2023-04-26 PROCEDURE — 96360 HYDRATION IV INFUSION INIT: CPT

## 2023-04-26 PROCEDURE — 250N000013 HC RX MED GY IP 250 OP 250 PS 637

## 2023-04-26 PROCEDURE — 258N000003 HC RX IP 258 OP 636

## 2023-04-26 RX ORDER — HYDROXYZINE HYDROCHLORIDE 50 MG/1
50 TABLET, FILM COATED ORAL ONCE
Status: COMPLETED | OUTPATIENT
Start: 2023-04-26 | End: 2023-04-26

## 2023-04-26 RX ORDER — ACETAMINOPHEN 325 MG/1
975 TABLET ORAL ONCE
Status: COMPLETED | OUTPATIENT
Start: 2023-04-26 | End: 2023-04-26

## 2023-04-26 RX ADMIN — SODIUM CHLORIDE, POTASSIUM CHLORIDE, SODIUM LACTATE AND CALCIUM CHLORIDE 1000 ML: 600; 310; 30; 20 INJECTION, SOLUTION INTRAVENOUS at 00:15

## 2023-04-26 RX ADMIN — ACETAMINOPHEN 325MG 975 MG: 325 TABLET ORAL at 00:14

## 2023-04-26 ASSESSMENT — ACTIVITIES OF DAILY LIVING (ADL)
ADLS_ACUITY_SCORE: 18
ADLS_ACUITY_SCORE: 18

## 2023-04-26 NOTE — PROGRESS NOTES
"[facility]  Labor & Delivery Triage Note    HPI: Yusra Gonzales is a 33 year old  at 24w6d by 11w0d US, here for evaluation of abdominal pain.  Patient reports she worked approximately 4 hours today.  Upon arrival home from work and she used a vaginal cream (prescribed for recently diagnosed bacterial vaginosis infection).  Shortly after using this vaginal cream she began experiencing \"excruciating, 10 out of 10\" upper abdominal/epigastric pain.  Given the severity of this pain and that it did not resolve, patient presented here to triage for further evaluation.  Currently patient reports that pain has gotten minimally better but is still present and constant.  Pain is worse with movement.  She denies having david painful contractions.  She is feeling good fetal movement.  Denies any leaking of fluid or vaginal bleeding.  She has never experienced a pain similar to this before in the past, and is unsure what it could be.  Denies fever, chills, vomiting, bowel or bladder complaints.    Pregnancy notable for:  - Recent immigration  - Recently dx BV, yeast infection   - Hx term IUFD   - Hx CS x1  - MDD    Lab Results   Component Value Date    HGB 11.7 2023       GBS Status: N/A     OBHX:  OB History    Para Term  AB Living   2 1 1 0 0 0   SAB IAB Ectopic Multiple Live Births   0 0 0 0 0      # Outcome Date GA Lbr Chris/2nd Weight Sex Delivery Anes PTL Lv   2 Current            1 Term      I.U. FETAL D   FD       Medical Hx:  History reviewed. No pertinent past medical history.    Surgical Hx:  Past Surgical History:   Procedure Laterality Date    LASIK BILATERAL         Medications:  Current Outpatient Medications   Medication Instructions    carboxymethylcellulose (REFRESH PLUS) 0.5 % SOLN ophthalmic solution 1 drop, Both Eyes, 4 TIMES DAILY    carboxymethylcellulose PF (CARBOXYMETHYLCELLULOSE SODIUM) 0.5 % ophthalmic solution 1 drop, Both " "Eyes, 3 TIMES DAILY PRN    Prenatal Vit-Fe Fumarate-FA (PRENATAL MULTIVITAMIN W/IRON) 27-0.8 MG tablet 1 tablet, Oral, DAILY AT 2 PM       Allergies:  No Known Allergies    FMHx:  History reviewed. No pertinent family history.    Social Hx:  Social History     Tobacco Use    Smoking status: Never    Smokeless tobacco: Never   Substance Use Topics    Alcohol use: Never    Drug use: Never         ROS: 10-point ROS negative except as in HPI.    Physical Exam:  Vitals:    23 2206 23 2228   BP: 110/68 109/70   Pulse: 103 87   Resp: 18 18   Temp:  97.6  F (36.4  C)   TempSrc:  Oral   SpO2: 100%    Weight: 63 kg (139 lb)    Height: 1.73 m (5' 8.11\")      GEN: Well-appearing female, appears uncomfortable.  No acute distress  CV: Regular rate, well perfused  PULM: On room air, no increased work of breathing  ABD: soft, gravid, minimal tenderness to palpation to epigastric region, nontender throughout remainder of abdomen, non-distended    NST:  FHT: baseline 140, moderate variability, + accels, occasional periods of brief decels.  Overall appropriate for 25-week gestational age  TOCO: 0 ctx in ten minutes    A/P: 33 year old  at 24w6d by 11w US, here for evaluation of abdominal pain.  Patient recently diagnosed with bacterial vaginosis and yeast infection.  Patient reports just prior to onset of abdominal pain she had used vaginal cream for the first time, though denies any local reaction.  Reports abdominal pain was severe and persistent prompting her presentation to triage.  Pain had moderately decreased shortly after presentation.  CBC, CMP, amylase, lipase were obtained for work-up and found to be largely within normal limits.  Patient administered IV fluids, Tylenol, GI cocktail and Zofran following 1 episode of small amount of emesis in triage.  Following several hours of observation and symptomatic treatment of presenting symptoms patient reported feeling improved.  Uncertain etiology of abdominal " pain but differential includes gastritis, and GERD. FHT while in triage overall category 1 with very brief periods of occasional decelerations.  Overall appears appropriate given 25-week gestation.  Discussed plan with patient to discharge her home.  Return precautions discussed at length.  She expressed understanding of this.  Patient instructed to keep all scheduled prenatal care visits.    Dispo: Discharge home via medical taxi    Patient discussed with Dr. Rajni Savage DO, MS  Obstetrics, Gynecology & Women's Health   Resident, PGY-2  04/26/2023 2:21 AM    Appreciate Dr. Savage's note above, patient's history and exam reviewed by me. I agree with the note above.   Keyanna Urban MD

## 2023-04-26 NOTE — PROVIDER NOTIFICATION
04/26/23 0138   Provider Notification   Provider Name/Title Dr. Flores   Method of Notification Electronic Page   Request Evaluate - Remote   Notification Reason Status Update   pt IV done, reports she is feeling better and thinks she can go home, declined Atarax. would you like to place DC orders    No response, second page sent at 0203    Response: discharge orders placed

## 2023-04-26 NOTE — DISCHARGE INSTRUCTIONS
Discharge Instruction for Undelivered Patients      You were seen for:  abdominal pain  We Consulted: Dr. Phillip   You had (Test or Medicine):labs, GI cocktail, tylenol, IV fluids     Diet:   Drink 8 to 12 glasses of liquids (milk, juice, water) every day.  You may eat meals and snacks.     Activity:  Call your doctor or nurse midwife if your baby is moving less than usual.     Call your provider if you notice:  Swelling in your face or increased swelling in your hands or legs.  Headaches that are not relieved by Tylenol (acetaminophen).  Changes in your vision (blurring: seeing spots or stars.)  Nausea (sick to your stomach) and vomiting (throwing up).   Weight gain of 5 pounds or more per week.  Heartburn that doesn't go away.  Signs of bladder infection: pain when you urinate (use the toilet), need to go more often and more urgently.  The bag of huston (rupture of membranes) breaks, or you notice leaking in your underwear.  Bright red blood in your underwear.  Abdominal (lower belly) or stomach pain.  For first baby: Contractions (tightening) less than 5 minutes apart for one hour or more.  Second (plus) baby: Contractions (tightening) less than 10 minutes apart and getting stronger.  *If less than 34 weeks: Contractions (tightening) more than 6 times in one hour.  Increase or change in vaginal discharge (note the color and amount)    Follow-up:  As scheduled in the clinic

## 2023-04-26 NOTE — PROVIDER NOTIFICATION
04/26/23 0119   Provider Notification   Provider Name/Title Dr. Savage   Method of Notification Electronic Page   Request Evaluate - Remote   Notification Reason Other (Comment)  (DC questions)     Questioned provider is they still planned to discharge patient after IV fluids as no orders ihave been put in yet and pt. Will need taxi called    Response: provider planning to place orders when IV completed and if patient feeling better

## 2023-04-26 NOTE — PROVIDER NOTIFICATION
04/25/23 9039   Provider Notification   Provider Name/Title Dr. Savage   Method of Notification Electronic Page   Request Evaluate - Remote   Notification Reason Decels;Status Update   pt. had new onset N/V followed by EFM with 4min prolonged decel starting at 2339    Response: start fluids, new order for zofran, continue EFM

## 2023-04-26 NOTE — ED NOTES
"Pt arrived to ED with complaint of shaking, shivering immediately after applying miconazole cream.  Pt reports 24 weeks pregnant.   Pt is having contractions Yes.   Pt feels urge to push No.   Pt reports water broke No.   Report was called and pt was transferred to L&D Yes.  Vida Charge RN  Pt denied any SOB \" just very cold\" Not in respiratory distress able to talk in straight sentences  "

## 2023-04-26 NOTE — PLAN OF CARE
Patient transferred to L&D from ED.  Arrived to unit looking very uncomfortable, shaking/shivering and reporting cramping pain in her upper abdomen (epigastric region/under her breasts) that radiates to her back. No lower abdominal cramping.  Abdomen soft and non tender.  VSS. Denies LOF or vaginal bleeding.  Reports active fetal movement.  Pain appears to always be present but have waves where it is more intense.  FHR AGA, no ctx noted on monitor.  Dr. Savage came to evaluate patient and making plan of care.  Report given to oncoming RN, care transferred.

## 2023-04-26 NOTE — PROVIDER NOTIFICATION
04/26/23 0114   Provider Notification   Provider Name/Title Dr. Savage   Method of Notification Electronic Page   Request Evaluate - Remote   Notification Reason Decels   FYI EFM showed another prolonged decel of about 4min at 0058    Response: EFM reviewed and provider indicated it is appropriate for gestational age with no current concerns

## 2023-05-04 ENCOUNTER — HOSPITAL ENCOUNTER (OUTPATIENT)
Dept: ULTRASOUND IMAGING | Facility: CLINIC | Age: 33
Discharge: HOME | End: 2023-05-04
Attending: OBSTETRICS & GYNECOLOGY
Payer: COMMERCIAL

## 2023-05-04 ENCOUNTER — OFFICE VISIT (OUTPATIENT)
Dept: MATERNAL FETAL MEDICINE | Facility: CLINIC | Age: 33
End: 2023-05-04
Attending: OBSTETRICS & GYNECOLOGY
Payer: COMMERCIAL

## 2023-05-04 DIAGNOSIS — Z87.59 HISTORY OF IUFD: Primary | ICD-10-CM

## 2023-05-04 DIAGNOSIS — Z87.59 HISTORY OF IUFD: ICD-10-CM

## 2023-05-04 DIAGNOSIS — Z36.89 ENCOUNTER FOR ULTRASOUND TO ASSESS FETAL GROWTH: ICD-10-CM

## 2023-05-04 PROCEDURE — 76816 OB US FOLLOW-UP PER FETUS: CPT | Mod: 26 | Performed by: OBSTETRICS & GYNECOLOGY

## 2023-05-04 PROCEDURE — 76816 OB US FOLLOW-UP PER FETUS: CPT

## 2023-05-04 NOTE — PROGRESS NOTES
"Please see \"Imaging\" tab under \"Chart Review\" for details of today's US at the [facility].    Jose Washington MD  Maternal-Fetal Medicine      "

## 2023-05-04 NOTE — NURSING NOTE
Patient reports feeling fetal movement, denies pain, denies contractions, leaking of fluid, or bleeding. SBAR given to [facility] MD, see their note in Epic.  Juni  ID [id number] used for patient's ultrasound and provider visit today at [facility].

## 2023-05-25 ENCOUNTER — OFFICE VISIT (OUTPATIENT)
Dept: MATERNAL FETAL MEDICINE | Facility: CLINIC | Age: 33
End: 2023-05-25
Attending: OBSTETRICS & GYNECOLOGY
Payer: COMMERCIAL

## 2023-05-25 ENCOUNTER — HOSPITAL ENCOUNTER (OUTPATIENT)
Dept: ULTRASOUND IMAGING | Facility: CLINIC | Age: 33
Discharge: HOME | End: 2023-05-25
Attending: OBSTETRICS & GYNECOLOGY
Payer: COMMERCIAL

## 2023-05-25 DIAGNOSIS — Z87.59 HISTORY OF IUFD: Primary | ICD-10-CM

## 2023-05-25 DIAGNOSIS — Z87.59 HISTORY OF IUFD: ICD-10-CM

## 2023-05-25 DIAGNOSIS — Z36.89 ENCOUNTER FOR ULTRASOUND TO ASSESS FETAL GROWTH: ICD-10-CM

## 2023-05-25 PROCEDURE — 76816 OB US FOLLOW-UP PER FETUS: CPT | Mod: 26 | Performed by: OBSTETRICS & GYNECOLOGY

## 2023-05-25 PROCEDURE — 76816 OB US FOLLOW-UP PER FETUS: CPT

## 2023-05-25 NOTE — PROGRESS NOTES
Please refer to ultrasound report under 'Imaging' Studies of 'Chart Review' tabs.    Michael Bermudez M.D.

## 2023-06-12 ENCOUNTER — TRANSCRIBE ORDERS (OUTPATIENT)
Dept: OTHER | Age: 33
End: 2023-06-12

## 2023-06-12 DIAGNOSIS — Z98.891 HISTORY OF CESAREAN DELIVERY: ICD-10-CM

## 2023-06-12 DIAGNOSIS — O09.90 HIGH RISK PREGNANCY, ANTEPARTUM: Primary | ICD-10-CM

## 2023-06-12 DIAGNOSIS — Z87.59 HISTORY OF STILLBIRTH: ICD-10-CM

## 2023-06-15 ENCOUNTER — OFFICE VISIT (OUTPATIENT)
Dept: MATERNAL FETAL MEDICINE | Facility: CLINIC | Age: 33
End: 2023-06-15
Attending: OBSTETRICS & GYNECOLOGY
Payer: COMMERCIAL

## 2023-06-15 ENCOUNTER — HOSPITAL ENCOUNTER (OUTPATIENT)
Dept: ULTRASOUND IMAGING | Facility: CLINIC | Age: 33
Discharge: HOME | End: 2023-06-15
Attending: OBSTETRICS & GYNECOLOGY
Payer: COMMERCIAL

## 2023-06-15 DIAGNOSIS — O09.293 HISTORY OF STILLBIRTH IN CURRENTLY PREGNANT PATIENT, THIRD TRIMESTER: Primary | ICD-10-CM

## 2023-06-15 DIAGNOSIS — Z36.89 ENCOUNTER FOR ULTRASOUND TO ASSESS FETAL GROWTH: ICD-10-CM

## 2023-06-15 DIAGNOSIS — Z87.59 HISTORY OF IUFD: ICD-10-CM

## 2023-06-15 PROCEDURE — 76816 OB US FOLLOW-UP PER FETUS: CPT | Mod: 26 | Performed by: OBSTETRICS & GYNECOLOGY

## 2023-06-15 PROCEDURE — 76816 OB US FOLLOW-UP PER FETUS: CPT

## 2023-06-15 PROCEDURE — 76819 FETAL BIOPHYS PROFIL W/O NST: CPT | Mod: 26 | Performed by: OBSTETRICS & GYNECOLOGY

## 2023-06-22 ENCOUNTER — HOSPITAL ENCOUNTER (OUTPATIENT)
Dept: ULTRASOUND IMAGING | Facility: CLINIC | Age: 33
Discharge: HOME | End: 2023-06-22
Attending: OBSTETRICS & GYNECOLOGY
Payer: COMMERCIAL

## 2023-06-22 ENCOUNTER — OFFICE VISIT (OUTPATIENT)
Dept: MATERNAL FETAL MEDICINE | Facility: CLINIC | Age: 33
End: 2023-06-22
Attending: OBSTETRICS & GYNECOLOGY
Payer: COMMERCIAL

## 2023-06-22 DIAGNOSIS — Z87.59 HISTORY OF IUFD: ICD-10-CM

## 2023-06-22 DIAGNOSIS — O09.293 HISTORY OF STILLBIRTH IN CURRENTLY PREGNANT PATIENT, THIRD TRIMESTER: Primary | ICD-10-CM

## 2023-06-22 DIAGNOSIS — Z36.89 ENCOUNTER FOR ULTRASOUND TO ASSESS FETAL GROWTH: ICD-10-CM

## 2023-06-22 PROCEDURE — 76819 FETAL BIOPHYS PROFIL W/O NST: CPT

## 2023-06-22 PROCEDURE — 76819 FETAL BIOPHYS PROFIL W/O NST: CPT | Mod: 26 | Performed by: OBSTETRICS & GYNECOLOGY

## 2023-06-22 NOTE — PROGRESS NOTES
"Please see \"Imaging\" tab under \"Chart Review\" for details of today's US at the [facility].    Jose Washington MD  Maternal-Fetal Medicine      "

## 2023-06-29 NOTE — PATIENT INSTRUCTIONS
The Benefits of Breastmilk  Breastmilk is the best food for your baby. It has just the right amount of nutrients. It protects your baby's digestive system. It protects other body systems in your baby. And it helps them grow and develop.       Healthiest for baby  Breastmilk is the ideal food for babies. It has all the nutrients your baby needs to grow healthy and strong.    Breastmilk has these benefits:  It lowers the risk for sudden infant death syndrome (SIDS).  It gives babies a lower risk for ear infections in their first year. This is compared to babies who are fed formula.  It has DHA. This is a type of fat. It helps your baby s growing brain, nervous system, and eyes.  It is full of antibodies. These help your baby fight infection.  It lowers your baby's risk for lung illness. It lowers their risk of diarrhea.  It lowers your baby s risk for allergies. Babies fed formula are more likely to have an allergy to cow's milk.  It lowers your baby s risk for colds and many other diseases.  It changes as your baby grows. This meets your baby's changing needs.  And it s important to know that:  Giving only breastmilk for the first 6 months gives your baby more of these benefits.  Giving breastmilk plus solid food from 6 months to 1 year or more gives more benefits.   babies have fewer long-term health problems when they grow up. These problems include diabetes and obesity.  Breastfeeding gives contact that your baby loves. Spending time skin-to-skin with you is calming and comforting.  Healthiest for mom  For many people, breastfeeding is a good experience. It creates a strong bond between mother and baby. People who breastfeed also get health benefits. Some benefits for you include:   You can know that your baby is growing healthy and strong because of your milk.  Breastmilk is convenient. It's free and clean. It's always at the right temperature.  Breastfeeding burns calories. This can help you lose  pregnancy weight faster.  Breastfeeding releases hormones that contract the uterus. This helps the uterus return to its normal size after childbirth.  Mothers who breastfeed have a lower risk for ovarian and breast cancers.  Some studies have found that breastfeeding may reduce a person's risk for type 2 diabetes and rheumatoid arthritis. It may reduce the risk of cardiovascular disease. This includes high blood pressure and high cholesterol.  Breastfeeding every day delays the return of your menstrual period. This can help extend the time between pregnancies.  Many people can help you learn to breastfeed. A lactation consultant can help. This is a healthcare provider who is trained to help you breastfeed. Your nurse, midwife, nurse practitioner, obstetrician, pediatrician, or family practice doctor can also help you learn about breastfeeding.   What does it mean to give only breastmilk?   Giving only breastmilk for at least the first 6 months of life is best for your baby. Feeding your baby from your breasts is best. If you need to be away from your baby, you can express breastmilk. This means pumping milk from your breast into a container. Talk with your healthcare provider about the best ways to feed this milk to your baby.    You should not give your baby water, sugar water, formula, or solids during their first 6 months unless your baby's healthcare provider tells you to.   Your baby s provider may tell you to give your baby vitamins, minerals, or medicines.  babies should be given vitamin D supplements. The provider will tell you the type and amount of vitamin D to give your baby.   What are the risks of not giving only breastmilk?   You now know the many of the benefits of breastfeeding. But you might not know why it's important to give only breastmilk for at least 6 months.   Your baby gets the best protection against health problems when they get only breastmilk. Breastfeeding some of the time is  good. But breastfeeding all of the time is best.   Giving your baby formula or other liquids may cause you to:  Have more problems breastfeeding  Make less milk  Be less confident in breastfeeding  Breastfeed less often  Stop breastfeeding before your baby is at least 12 months old                                                     When other options may be needed  Giving only breastmilk is almost always the best thing to do. But your healthcare provider may have reasons to advise giving your baby formula or other liquids. They include:   Your baby has a health problem. There are cases where you may need to add formula or other liquids. This is often only for a short time. This may be the case if your baby has low blood sugar (hypoglycemia), loses body fluids (dehydration), or has high levels of bilirubin.  You have certain health problems. Some infections can be passed from your skin to your baby's skin. Or it can pass through your breastmilk. People with HIV/AIDS or untreated and contagious TB (tuberculosis) should not breastfeed. Women with active skin sores from chickenpox (varicella) can pump their breastmilk and feed their baby. But they should keep their baby s skin from touching any of the sores.  You use illegal drugs or drink alcohol. People who use illegal drugs should not breastfeed. If you are going to have a drink that has alcohol, it's best to do so just after you nurse or pump milk. Breastfeeding or pumping breast milk is OK at least 4 hours after your last drink. That way, your body will have some time to get rid of the alcohol before the next feeding. Less of it will reach your baby. Long-term exposure to alcohol in breastmilk may affect your baby's health. It may also cause you to make less milk.  You take certain medicines. If you take any medicines, ask your baby s healthcare provider if you can breastfeed.  [employer] last reviewed this educational content on 4/1/2020 2000-2022 [employer]. All rights reserved. This information is not intended as a substitute for professional medical care. Always follow your healthcare professional's instructions.          What Is Group B Strep?  Group B strep (streptococcus) is a common type of bacteria. It can grow in a woman s vagina, rectum, or urinary tract. It most often does not cause harm in adults. But in rare cases, a woman who has group B strep can infect her baby during the birth. This can cause serious illness in the . But treating the mother during labor reduces the risk of the baby becoming infected. And if a  gets group B strep, the infection can be treated.     Facts about group B strep   Learning more about group B strep can help you understand how testing and treatment can help. Here are some basic facts about group B strep:   It is not a sexually transmitted disease.  It is not the same as strep throat. (That is caused by group A strep.)  It often has no symptoms. It may cause no problems in adults.  Test results can be misleading. They may be negative one week and positive the next week.  Group B strep can be spread to the baby during vaginal delivery. It cannot be passed during  (surgical) birth.  A mother with group B strep rarely infects her . (Infection happens only about 1% to 2% of the time.)  When a mother is treated during labor and delivery, her baby almost never becomes infected.  Certain factors during pregnancy increase the risk of a baby becoming infected.  Possible effects on your baby   Group B strep can infect the blood. It can also cause inflammation of the baby s lungs, brain, or spinal cord. Long-term effects can include blindness, deafness, mental retardation, or cerebral palsy. And in rare cases, infection causes death. Infection is most often found soon after the baby is born.   How your baby may become infected   Group B strep often lives in the vagina or rectum. If the amniotic sac  breaks early, bacteria from the vagina can travel to the uterus, reaching the baby. Or, as the baby passes through the birth canal, it can come in contact with the bacteria. In rare cases, group B strep can be passed to the baby after delivery. This is called late-onset group B strep. The source of this type of infection is not well understood. But some experts believe that it happens if the baby is exposed to group B strep in the home, from the parents or siblings, or in the community.   What increases the risk?   Certain risk factors increase the chance that a baby will be infected. They include:   Breaking or leaking of the amniotic sac before 37 weeks of pregnancy  Labor before 37 weeks of pregnancy  Breaking of the amniotic sac more than 18 hours before labor starts  Fever during labor  A urinary tract infection with group B strep at any point in the pregnancy  Having a previous baby born with a group B strep infection  Meh[employer] last reviewed this educational content on 2020-2022 [employer]. All rights reserved. This information is not intended as a substitute for professional medical care. Always follow your healthcare professional's instructions.          Vitamin K Deficiency Bleeding (VKDB) in a [facility] Baby  What is vitamin K deficiency bleeding in a ?  Vitamin K deficiency bleeding (VKDB) is a problem that occurs in some  babies. It most often happens during the first few days and weeks of life. But it can occur up to 6 months of age. This condition used to be called hemorrhagic disease of the .    What causes vitamin K deficiency bleeding in a ?  Babies are normally born with low levels of vitamin K. Vitamin K is needed for blood to clot. Not having enough vitamin K is the main cause of vitamin deficiency bleeding. If your baby s blood doesn t clot, they may have severe bleeding or a hemorrhage. This can be life-threatening. The cause of vitamin K  deficiency depends on the 3 types of VKDB:   Early VKDB. This can occur right after birth or up to 24 hours of age. It's caused by certain medicines the mother took during pregnancy  Classical VKDB. This occurs from 1 to 7 days after birth. It's caused by low levels of vitamin K found in newborns.  Late VKDB. This most often occurs up to 3 months after birth. But it can occur up to 6 months after birth. It can occur in a baby who did not get a vitamin K shot at birth and who was  only.    Which newborns are at risk for vitamin K deficiency bleeding?   These things may make it more likely for a baby to have this condition:   Not getting a vitamin K shot at birth.The American Academy of Pediatrics recommends that all newborns get a vitamin K shot. This can prevent severe bleeding.   Being  only and not getting a vitamin K shot at birth.  Breastmilk has less vitamin K than formula made with cow s milk. The vitamin K shot will provide what a  baby needs. A mother who takes a vitamin K supplement while breastfeeding will not raise her baby's vitamin K level.  Being born to a mother who took certain medicines during pregnancy.  These include medicines for seizures (anticonvulsants) and medicines for blood-clotting problems (anticoagulants).  What are the symptoms of vitamin K deficiency bleeding in a ?   Symptoms can occur a bit differently in each child. They can include:   Blood in your baby's stool that make it black and sticky (tarry)  Blood in your baby's urine  Oozing of blood from around your baby s umbilical cord or circumcision site  Bruising more easily than normal. This may happen around your baby's head and face.  Beingvery sleepy or fussy. In severe cases, vitamin K deficiency may cause bleeding in and around the brain. Other signs of bleeding in the brain can include seizures or vomiting, not just spitting up.  The symptoms of this condition may be similar to symptoms of  other health issues. Make sure your child sees a healthcare provider for a diagnosis.   How is vitamin K deficiency bleeding in a  diagnosed?   The healthcare provider will look at your baby's health history. They will also check your baby for signs of bleeding. Your baby may need lab tests to measure their blood clotting times. The results of these tests can help your child s healthcare provider make the diagnosis.   How is vitamin K deficiency bleeding in a  treated?   Treatment will depend on your child s symptoms, age, and general health. It will also depend on how severe the condition is.   Your baby will probably get a vitamin K shot.   Your baby may need a blood transfusion if they have severe bleeding. If your baby is severely ill, they may need to be treated in the intensive care unit (ICU).   What are possible complications of vitamin K deficiency bleeding in a ?   Vitamin K deficiency bleeding can lead to life-threatening problems. These include dangerous bleeding that can lead to brain damage or death. In the [place] deaths and long-term complications from vitamin K deficiency have been greatly lowered because of vitamin K shots given at birth. But bleeding into the brain, central nervous system, stomach, intestines, or other parts of the body can cause serious problems, or even death.   Can vitamin K deficiency bleeding in a  be prevented?   This condition can be prevented. The American Academy of Pediatrics recommends that all newborns get a vitamin K shot. Your child will get a shot into their upper leg (thigh) muscle. This shot will be given soon after birth. This will prevent dangerous bleeding.   Key points about vitamin K deficiency bleeding in a    Vitamin K deficiency bleeding is a problem that occurs in some newborns. It often happens during the first few days of life.  Babies are normally born with low levels of vitamin K. Not having enough vitamin K is the  main cause of this condition.  Your child s healthcare provider will diagnose this condition. This will be based on your child s signs of bleeding and lab tests for blood clotting times.  The American Academy of Pediatrics recommends that all newborns get a vitamin K shot. This can prevent this condition.     Next steps  Tips to help you get the most from a visit to your child s healthcare provider:   Know the reason for the visit and what you want to happen.  Before your visit, write down questions you want answered.  At the visit, write down the name of a new diagnosis, and any new medicines, treatments, or tests. Also write down any new instructions your provider gives you for your child.  Know why a new medicine or treatment is prescribed and how it will help your child. Also know what the side effects are.  Ask if your child s condition can be treated in other ways.  Know why a test or procedure is recommended and what the results could mean.  Know what to expect if your child does not take the medicine or have the test or procedure.  If your child has a follow-up appointment, write down the date, time, and purpose for that visit.  Know how you can contact your child s provider after office hours. This is important if your child becomes ill and you have questions or need advice.  [employer] last reviewed this educational content on 4/1/2022 2000-2022 [employer]. All rights reserved. This information is not intended as a substitute for professional medical care. Always follow your healthcare professional's instructions.          Circumcision for Children  What is circumcision for children?  Circumcision is a surgery to remove the skin covering the end of the penis. This skin is called the foreskin. This surgery is most often done 1 or 2 days after a baby s birth. Circumcision can also be done on older children. This can be more complex. An older child may need medicine (general anesthesia) to put  them to sleep during the procedure.   Why might my child need circumcision?  In some cultures, circumcision is a Hoahaoism practice or a tradition. It's most common in Adventist and Islamic faiths. In the [place]  circumcision isn't required. It's an elective procedure. This means you can choose to have your child circumcised or not. Circumcision is often done 1 to 2 days after birth. It's helpful to decide before your baby is born.   It's important to learn about the benefits and risks of circumcision. According to the American Academy of Pediatrics (AAP):   Problems with the penis (such as irritation) can happen with or without circumcision.  There is no difference in health and cleanliness (hygiene) with or without circumcision, as long as a child can handle cleaning and care.  There is a higher risk of urinary tract infection (UTI) in uncircumcised children. This is more so in babies younger than 1 year old. But the risk for UTI in all children is less than 1%.   circumcision does give some protection from cancer of the penis later in life. But the overall risk of penile cancer is very low in developed countries, such as the [place]  Circumcised kids and adults have a lower risk for some sexually transmitted infections. This includes HIV.  The AAP has found that the health benefits of circumcision are greater than the risks. But the AAP also found that these benefits are not great enough to advise that all  babies be circumcised. Parents must decide what's best for their baby.   What are the risks of circumcision for a child?  Circumcision has some risks. But the rate of problems is low. The most common risks are bleeding and infection.   The skin of the penis is also very sensitive after a circumcision. The area can get irritated from contact with the baby s diaper or with the ammonia in urine. This can be treated by putting petroleum jelly on the penis for a few days.                                          There may be other risks. This depends on your baby s health. Talk about any concerns you have with the healthcare provider before the surgery.   How do I help my child get ready for circumcision?  Make sure the healthcare provider fully explains the procedure. Ask if anesthetic is used for a circumcision. The AAP advises anesthetic. This helps reduce a baby s pain during the procedure.   If your baby is born early or has other health problems, they may not be circumcised until they're ready to leave the hospital. If your baby has a physical problem with their penis, they may not be circumcised. This is because the foreskin is used in a future surgery on the penis.   What happens during circumcision for a child?  The procedure is usually done by an obstetrician or pediatrician in the hospital. When it's done for Jew reasons, other people may do the surgery after the baby comes home from the hospital.   Circumcision is done only on healthy babies. The procedure is painful. So the AAP advises using a local anesthetic. This numbs the area of the penis where the incision will be made. There are different types of anesthetic. A healthcare provider may put a numbing cream on your child s penis. Or they may inject small amounts of anesthetic around the penis. There are risks with any anesthetic, but these are considered safe. In addition to the anesthetic, your provider may give your baby a pacifier dipped in sugar water. This can help soothe them while the procedure is happening.   A circumcision can be done in several ways. The procedure usually takes about 15 minutes or less. The procedure goes like this:   The healthcare provider will give your baby a local anesthetic.  The provider then cleans the penis with an antiseptic.  The provider will gently loosen the foreskin from around the head of the penis, making a small slit in the foreskin.  The provider may use 1 of the common methods to remove the  foreskin. These methods use devices that help protect the penis while removing the foreskin.  The provider may attach a clamp over the head of the penis. Or the provider may place a plastic ring over the head of the penis. This makes it easier to cut the foreskin.  The provider will use surgical tools to remove the foreskin. This exposes the end of the penis.  The provider may place some petroleum jelly or ointment on the head of the penis and cover it with a loose gauze dressing.  What happens after circumcision for a child?  After the circumcision, you'll need to care for your baby s penis until it heals. This includes cleaning the area with plain water at least once a day. You'll also need to clean it if the area is dirty after a bowel movement. Then let the area dry, and put petroleum jelly on it. This keeps the gauze dressing from sticking.   You may be asked to remove the dressing the next day. Or you may be asked to use a new dressing, and some petroleum jelly, each time you change diapers. When the gauze dressing is no longer needed, you may be told to keep putting petroleum jelly on the end of the penis for a few more days. This helps prevent the penis from sticking to the diaper.   Some swelling on the penis is normal. It's also normal for the penis to develop a crust. This will go away after a few days. A small amount of bleeding may occur. But if you see a blood stain on your baby s diaper that's bigger than a quarter, call the healthcare provider right away. If the penis keeps bleeding, apply firm pressure with a washcloth for a few minutes. Then look to see if the bleeding has stopped. If the bleeding continues, bring your child to the emergency room.   If a plastic ring was used, it should fall off in 10 to 12 days. Tell your healthcare provider if this doesn t happen.   A baby s penis usually fully heals from a circumcision in 7 to 10 days.   Call your child s healthcare provider if your baby has any  "of the following:   Fever (see \"Fever and children\" below)  Wound that doesn t stop bleeding  No urine 6 to 8 hours after the procedure  Redness or swelling that doesn t get better after 3 days, or gets worse  Yellow discharge or yellow coating on the penis after 7 days  Fever and children  Use a digital thermometer to check your child s temperature. Don t use a mercury thermometer. There are different kinds and uses of digital thermometers. They include:   Rectal. For children younger than 3 years, a rectal temperature is the most accurate.  Forehead (temporal). This works for children age 3 months and older. If a child under 3 months old has signs of illness, this can be used for a first pass. The provider may want to confirm with a rectal temperature.  Ear (tympanic). Ear temperatures are accurate after 6 months of age, but not before.  Armpit (axillary). This is the least reliable but may be used for a first pass to check a child of any age with signs of illness. The provider may want to confirm with a rectal temperature.  Mouth (oral). Don t use a thermometer in your child s mouth until he or she is at least 4 years old.  Use the rectal thermometer with care. Follow the product maker s directions for correct use. Insert it gently. Label it and make sure it s not used in the mouth. It may pass on germs from the stool. If you don t feel OK using a rectal thermometer, ask the healthcare provider what type to use instead. When you talk with any healthcare provider about your child s fever, tell him or her which type you used.   Below are guidelines to know if your young child has a fever. Your child s healthcare provider may give you different numbers for your child. Follow your provider s specific instructions.   Fever readings for a baby under 3 months old:   First, ask your child s healthcare provider how you should take the temperature.  Rectal or forehead: 100.4 F (38 C) or higher  Armpit: 99 F (37.2 C) or " higher  Fever readings for a child age 3 months to 36 months (3 years):   Rectal, forehead, or ear: 102 F (38.9 C) or higher  Armpit: 101 F (38.3 C) or higher  Call the healthcare provider in these cases:   Repeated temperature of 104 F (40 C) or higher in a child of any age  Fever of 100.4 F (38 C) or higher in baby younger than 3 months  Fever that lasts more than 24 hours in a child under age 2  Fever that lasts for 3 days in a child age 2 or older  Next steps  Before you agree to the test or the procedure for your child make sure you know:   The name of the test or procedure  The reason your child is having the test or procedure  What results to expect and what they mean  The risks and benefits of the test or procedure  When and where your child is to have the test or procedure  Who will do the procedure and what that person s qualifications are  What would happen if your child did not have the test or procedure  Any alternative tests or procedures to think about  When and how will you get the results  Who to call after the test or procedure if you have questions or your child has problems  How much will you have to pay for the test or procedure  [employer] last reviewed this educational content on 3/1/2022    [id number] [employer]. All rights reserved. This information is not intended as a substitute for professional medical care. Always follow your healthcare professional's instructions.      Thank you for trusting us with your care!     If you need to contact us for questions about:  Symptoms, Scheduling & Medical Questions; Non-urgent (2-3 day response) [place] message, Urgent (needing response today) [phone] (if after 3:30pm next day response)   Prescriptions: Please call your Pharmacy   Billing: [facility] [phone] or  Physicians:[phone]

## 2023-06-30 ENCOUNTER — HOSPITAL ENCOUNTER (OUTPATIENT)
Dept: ULTRASOUND IMAGING | Facility: CLINIC | Age: 33
Discharge: HOME | End: 2023-06-30
Attending: OBSTETRICS & GYNECOLOGY
Payer: COMMERCIAL

## 2023-06-30 ENCOUNTER — OFFICE VISIT (OUTPATIENT)
Dept: MATERNAL FETAL MEDICINE | Facility: CLINIC | Age: 33
End: 2023-06-30
Attending: OBSTETRICS & GYNECOLOGY
Payer: COMMERCIAL

## 2023-06-30 DIAGNOSIS — O09.293 HISTORY OF STILLBIRTH IN CURRENTLY PREGNANT PATIENT, THIRD TRIMESTER: Primary | ICD-10-CM

## 2023-06-30 DIAGNOSIS — O09.293 HISTORY OF STILLBIRTH IN CURRENTLY PREGNANT PATIENT, THIRD TRIMESTER: ICD-10-CM

## 2023-06-30 PROCEDURE — 76819 FETAL BIOPHYS PROFIL W/O NST: CPT | Mod: 26 | Performed by: OBSTETRICS & GYNECOLOGY

## 2023-06-30 PROCEDURE — 76819 FETAL BIOPHYS PROFIL W/O NST: CPT

## 2023-06-30 NOTE — PROGRESS NOTES
"Please see \"Imaging\" tab under \"Chart Review\" for details of today's ultrasound.    Israel Coronado M.D.  Specialist in Maternal-Fetal Medicine     "

## 2023-07-02 PROBLEM — O21.9 NAUSEA AND VOMITING DURING PREGNANCY: Status: ACTIVE | Noted: 2023-02-13

## 2023-07-02 PROBLEM — O09.90 HIGH RISK PREGNANCY, ANTEPARTUM: Status: ACTIVE | Noted: 2023-01-10

## 2023-07-02 PROBLEM — N90.810 FEMALE CIRCUMCISION: Status: ACTIVE | Noted: 2023-02-13

## 2023-07-02 PROBLEM — Z87.59 HISTORY OF STILLBIRTH: Status: ACTIVE | Noted: 2023-02-13

## 2023-07-02 PROBLEM — F33.1 MDD (MAJOR DEPRESSIVE DISORDER), RECURRENT EPISODE, MODERATE (H): Status: ACTIVE | Noted: 2023-01-05

## 2023-07-02 PROBLEM — K02.9 DENTAL CARIES: Status: ACTIVE | Noted: 2023-05-08

## 2023-07-02 PROBLEM — Z87.59 HISTORY OF POSTPARTUM HEMORRHAGE: Status: ACTIVE | Noted: 2023-02-13

## 2023-07-02 PROBLEM — E55.9 VITAMIN D DEFICIENCY: Status: ACTIVE | Noted: 2023-02-13

## 2023-07-02 NOTE — PROGRESS NOTES
"Transfer of Care  SUBJECTIVE  33 year old woman presents to clinic for transfer of OB care appointment.    Patient's last menstrual period was 2022.  at 34w5d by Estimated Date of Delivery: Aug 9, 2023 based on 11 week US.    - Feels well overall.   - Pt was receiving prenatal care from the [facility].  Initiated prenatal care at 22/1 weeks, has had 9 visit total.      - Reason for transfer to [facility] care desires birth at [facility]  - prenatal records available in Epic, reviewed and from the [facility]   - After review of prenatal records, additional routine orders are recommended including: Needs growth every 3 weeks and BPPs every week after 34 weeks for history of stillborn baby at term    -Level II Ultrasound abnormal results, follow up per M growth every 3 weeks, weekly BPPs after 37 weeks  - Pre pregnancy BMI 19.7.   Pre Pregnancy Weight 128.  Height 5'7\".       OB visits with the [facility]: ., , , , 23, 5/3/23, , , Tdap 23  Hx of CS with stillborn baby    OB Intake: FOB/Asmerom is NOT involved and supportive.  5 years -  - he lives in another state.   He will come when she has the baby, he will not help with housing, money, food.   Yusra is interested in social work referral and MVNA referral     Dental concerns, Has needed to have 6 teeth extracted    Expecting a boy!  Last growth US 32/1 weeks, cephalic, posterior placenta, normal fluid, EFW 32%, 13%      Reviewed labs from the [facility]. All normal results except Hep B Non Immune,   Ferritin 16, iron binding capacity high  Hgb 12.4, 1 hour elevated, normal 3 hour, 23 NILM HPV neg, pap       OTHER CONCERNS: History of traumatic birth    Yusra has a history of stillbirth at term in [facility] 2012. She had been having regular prenatal care throughout her pregnancy and had an uncomplicated pregnancy. When she had   onset of labor at home and called her " doctor, her doctor told her to go to the closest birth center. She was 9cm upon arrival and was advised to start pushing by the two   providers who were there (she shared that they had identified themselves as doctors, but later found out that they were not). They gave her an episiotomy, one of the providers was pushing on her belly to help the baby be born.     A doctor was called to assist, at which time a fetal demise was diagnosed. She shared that the doctor told her that the providers should have called for help sooner. She underwent delivery by  section and was advised that she should have  section for future deliveries due to a narrow pelvis. Prenatal and delivery records prior the prior delivery are not available.     DENIES previous complications during pregnancy/birth such as SGA, IUGR,  labor with cervical dilation, gestational diabetes, shoulder dystocia, vacuum/forceps delivery, postpartum hemorrhage, gestational HTN, or preeclampsia.    MFM recommendation:   We discussed that given her history of stillbirth at term without a clear etiology that I would recommend close fetal surveillance in the current pregnancy with serial   ultrasounds to monitor fetal growth, as well as  surveillance. We also discussed the recommendation for delivery at 39 weeks gestation.   Serial ultrasounds (every 4 weeks) are recommended, as well as  surveillance beginning at around 32 weeks gestation given the history of IUFD at term. We will   plan to scheduled these appointments through our office.     Personal/social hx: Fled from [place] 6 months ago. Lives with cousins. Feels safe. FOB is not involved. He was not wanting to be involved in the pregnancy. She feels safe in regards to him as well.   Employment: none. Speaks Lao and some English.     GENETICS  - Genetic/Infection questionnaire completed, initiated care at 21 weeks    Have you traveled during the pregnancy? No  Have  your sexual partner(s) travelled during the pregnancy? No    - Current Medications    Current Outpatient Medications   Medication Sig Dispense Refill     Prenatal Vit-Fe Fumarate-FA (PRENATAL MULTIVITAMIN W/IRON) 27-0.8 MG tablet Take 1 tablet by mouth daily at 2 pm       Vitamin D3 (CHOLECALCIFEROL) 25 mcg (1000 units) tablet Take 1 tablet by mouth daily       acetaminophen (TYLENOL) 500 MG tablet Take 500 mg by mouth (Patient not taking: Reported on 7/3/2023)       carboxymethylcellulose (REFRESH PLUS) 0.5 % SOLN ophthalmic solution Place 1 drop into both eyes 4 times daily (Patient not taking: Reported on 7/3/2023) 30 mL 11     carboxymethylcellulose PF (CARBOXYMETHYLCELLULOSE SODIUM) 0.5 % ophthalmic solution Place 1 drop into both eyes 3 times daily as needed for dry eyes (Patient not taking: Reported on 7/3/2023) 50 each 0         - Co-morbids  History reviewed. No pertinent past medical history.    - Risk for GDM -  has No known risk factors for GDM WILL NOT have an early GCT and possible Hgb A1C    - High Risk for Pre E-  Has No known risk factors of High risk for Pre E so WILL NOT start low dose aspirin (81mg) starting between 12 and 28 weeks to prevent early onset preeclampsia.    - Moderate risk - has moderate risk factors for Pre E including Sociodemographic characteristics (Racism, Less access given lower SES) and Personal history factors (e.g., low birthweight or small for gestational age, previous adverse pregnancy outcome, >10-year pregnancy interval)    Since she meets two  of the moderate risk facrtors for Pre E -   so would have been eligible to start low dose aspirin but this was not started by her previous provider.     - The patient  does not have a history of spontaneous  birth so  WILL NOT consider progesterone starting at 16-20 weeks and/or serial transvaginal cervical length ultrasounds from 16-24 weeks.     -The patient does not have a history of immunosuppresion or HIV so  Toxoplasma IgG/IgM WILL NOT be ordered.    PERSONAL/SOCIAL HISTORY  Partner is not involved,  [place]   he lives in another state and is not supportive.   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  History of anxiety or depression  Yes, PTSD, please follow up next visit    Additional items: has WIC, interested in MVNA and social work referral      PSYCHIATRIC: Has History of depression, anxiety and PTSD  PHQ-9 score:        7/3/2023     2:35 PM   PHQ-9 SCORE   PHQ-9 Total Score 1          No data to display                  Past History:  Her past medical history History reviewed. No pertinent past medical history..   She has a history of  stillbirth,  section  Since her last LMP she denies use of alcohol, tobacco and street drugs.  HISTORY:  Family History   Problem Relation Age of Onset     Breast Cancer Other      Colon Cancer Other      Diabetes Other      Hypertension Other      Social History     Socioeconomic History     Marital status: Patient Declined   Tobacco Use     Smoking status: Never     Smokeless tobacco: Never   Substance and Sexual Activity     Alcohol use: Never     Drug use: Never     Sexual activity: Yes     Current Outpatient Medications   Medication Sig     Prenatal Vit-Fe Fumarate-FA (PRENATAL MULTIVITAMIN W/IRON) 27-0.8 MG tablet Take 1 tablet by mouth daily at 2 pm     Vitamin D3 (CHOLECALCIFEROL) 25 mcg (1000 units) tablet Take 1 tablet by mouth daily     acetaminophen (TYLENOL) 500 MG tablet Take 500 mg by mouth (Patient not taking: Reported on 7/3/2023)     carboxymethylcellulose (REFRESH PLUS) 0.5 % SOLN ophthalmic solution Place 1 drop into both eyes 4 times daily (Patient not taking: Reported on 7/3/2023)     carboxymethylcellulose PF (CARBOXYMETHYLCELLULOSE SODIUM) 0.5 % ophthalmic solution Place 1 drop into both eyes 3 times daily as needed for dry eyes (Patient not taking: Reported on  "7/3/2023)     No current facility-administered medications for this visit.     No Known Allergies    ============================================  MEDICAL HISTORY  History reviewed. No pertinent past medical history.  Past Surgical History:   Procedure Laterality Date     LASIK BILATERAL         OB History    Para Term  AB Living   2 1 1 0 0 0   SAB IAB Ectopic Multiple Live Births   0 0 0 0 0      # Outcome Date GA Lbr Chris/2nd Weight Sex Delivery Anes PTL Lv   2 Current            1 Term 19 40w0d   F CS-Unspec  N FD      Birth Comments: 22 Pt went into spontaneous labor, went to a birth center in [facility], was advised to start pushing at 9cm, had an episiotomy, doctor was called and baby  in utero. She was then advised to have a  section.      Obstetric Comments   22 Pt went into spontaneous labor, went to a birth center in [facility], was advised to start pushing at 9cm, had an episiotomy, doctor was called and baby  in utero. She was then advised to have a  section.    No GHTN, GDM,    Did have an episiotomy and postpartum hemorrhage           GYN History- No Abnormal Pap Smears                        Cervical procedures: None                        History of STI: none  Last pap 23 NILM HPV neg,    I personally reviewed the past social/family/medical and surgical history on the date of service.       ROS: 10 point ROS neg other than the symptoms noted above in the HPI.    Objective  /65   Pulse 89   Ht 1.73 m (5' 8.11\")   Wt 68.4 kg (150 lb 12.8 oz)   LMP 2022   BMI 22.86 kg/m       Physical Exam:  General: Pleasant, articulate, well-groomed, well-nourished female.  Not in any apparent distress  Neck: Supple  Lungs:  nonlabored breathing pattern  Neuro: alert and oriented x 3     Assessment/Plan  33 year old , 34w5d weeks of pregnancy with SAVANNAH of Aug 9, 2023 by LMP/US  Intrauterine pregnancy 34w5d size  consistent with dates  Genetic " Screening: initiated care too late for first tri screen    (Z87.59) History of stillbirth  (primary encounter diagnosis)  Plan: Will have a growth US every 3 weeks, BPP every week after 34 wks plan to induce at 39 weeks      (Z87.898) History of birth trauma, stillborn baby Ciarra, see note from 7/3/23  Plan: Review mental health needs next visit    (Z98.891) History of  delivery  Plan:   -Both vaginal delivery and  delivery risks and benefits were discussed with Yusra.    -Reviewed Yusra's delivery history and how it can impact this delivery and chance for success for a .  Discussed that her chance at successful vaginal birth is around 65%.  Reviewed the 1% risk of uterine rupture with either method of delivery, just different risks with each.  If the uterus does rupture she will need an emergency , the baby may experience trauma or death. This is very unlikely to occur.  -Discussed that the risk involved with a  section is higher for someone who has labored first.     -If she would like to have a TOLAC, advised that she come to the hospital sooner than if she hadn't had surgery in the past.    -Ideally we will avoid an induction of labor, but if this is indicated because of gestational age or a medical issue, Pitocin can be used. This would increase the risk of uterine rupture to about 1.9%.    -Recovery with vaginal delivery is usually easier than with  section and usually has a shorter stay in the hospital.    -Epidural Anesthesia is still an option if she is attempting a .   -Reviewed benefits to the baby with a vaginal delivery.    -Reviewed risks of surgery, including infection, bleeding, and injury to adjacent organs. Discussed that this risk increases as the number of surgeries increases.    -During her labor process she can change her mind if she decides she wants a repeat  section.    -Reviewed expected hospital stay and recovery limitations following  a  section, especially with lifting and driving.    -Baby's born by  have a slightly increased risk of transient tachypnea of the . This often resolves within 1-2 days.  -Advised that a scheduled  should be no earlier than 39 weeks gestation unless there is a medical issue that would make delivery earlier recommended.    - consent was signed after all questions were answered. Sent for scanning    Inadequate social support:  -Referred to social work to get access to resources and MVNA      - Oriented to Practice, types of care, and how to reach a provider.  Pt prefers CNM team  - Patient has BPPs and growth US scheduled with MFM    - Educational handout on the prevention of infections diseases during pregnancy provided.  - Reviewed healthy diet and foods to avoid, exercise and activity during pregnancy; avoiding exposure to toxoplasmosis; and maintenance of a generally healthy lifestyle.   - Discussed the harms, benefits, side effects and alternative therapies for current prescribed and OTC medications. Patient was encouraged to start prenatal vitamins as tolerated.    - Reviewed use of triage nurse line and contacting the on-call provider after hours for an urgent need such as fever, vagina bleeding, bladder or vaginal infection, rupture of membranes,  or term labor.      - Reviewed best evidence for: weight gain for her weight and height for pregnancy:  Based on pre-pregnancy 18  RECOMMENDED WEIGHT GAIN: 28-40    -   - Reviewed low risk for PTL/PTB, reviewed s/sx to watch for.  - Reviewed low risk for diabetes in pregnancy, will screen at 26-28 weeks   - Reviewed higher risk for pre-eclampsia due to living as a person [place] color in the [place] and hx of stillborn baby, however was not initiated on ASA previously so will not start now    - Pregnancy concerns to be addressed by provider at next OB visit include: mental health.     - All pt's questions discussed and answered.  Pt  verbalized understanding of and agreement to plan of care.       - Continue scheduled prenatal care and prn if questions or concerns    Ila Mark CNM

## 2023-07-03 ENCOUNTER — OFFICE VISIT (OUTPATIENT)
Dept: OBGYN | Facility: CLINIC | Age: 33
End: 2023-07-03
Attending: ADVANCED PRACTICE MIDWIFE
Payer: COMMERCIAL

## 2023-07-03 VITALS
HEART RATE: 89 BPM | WEIGHT: 150.8 LBS | DIASTOLIC BLOOD PRESSURE: 65 MMHG | HEIGHT: 68 IN | SYSTOLIC BLOOD PRESSURE: 100 MMHG | BODY MASS INDEX: 22.85 KG/M2

## 2023-07-03 DIAGNOSIS — O09.90 HIGH RISK PREGNANCY, ANTEPARTUM: ICD-10-CM

## 2023-07-03 DIAGNOSIS — Z65.8 INADEQUATE SOCIAL SUPPORT: ICD-10-CM

## 2023-07-03 DIAGNOSIS — Z87.898 HISTORY OF BIRTH TRAUMA: ICD-10-CM

## 2023-07-03 DIAGNOSIS — Z87.59 HISTORY OF STILLBIRTH: Primary | ICD-10-CM

## 2023-07-03 DIAGNOSIS — Z98.891 HISTORY OF CESAREAN DELIVERY: ICD-10-CM

## 2023-07-03 PROCEDURE — G0463 HOSPITAL OUTPT CLINIC VISIT: HCPCS | Performed by: ADVANCED PRACTICE MIDWIFE

## 2023-07-03 PROCEDURE — 99207 PR PRENATAL VISIT: CPT | Performed by: ADVANCED PRACTICE MIDWIFE

## 2023-07-03 RX ORDER — ACETAMINOPHEN 500 MG
500 TABLET ORAL
Status: ON HOLD | COMMUNITY
Start: 2023-02-02 | End: 2023-08-12

## 2023-07-03 ASSESSMENT — PATIENT HEALTH QUESTIONNAIRE - PHQ9: SUM OF ALL RESPONSES TO PHQ QUESTIONS 1-9: 1

## 2023-07-03 NOTE — LETTER
"7/3/2023       RE: Yusra Gonzales  [id number] [facility] [place] [address]     Dear Colleague,    Thank you for referring your patient, Yusra Gonzales, to the [facility] [place] at [facility]. Please see a copy of my visit note below.    Transfer of Care  SUBJECTIVE  33 year old woman presents to clinic for transfer of OB care appointment.    Patient's last menstrual period was 2022.  at 34w5d by Estimated Date of Delivery: Aug 9, 2023 based on 11 week US.    - Feels well overall.   - Pt was receiving prenatal care from the [facility].  Initiated prenatal care at 22/ weeks, has had 9 visit total.      - Reason for transfer to [facility] care desires birth at [facility]  - prenatal records available in Epic, reviewed and from the [facility]   - After review of prenatal records, additional routine orders are recommended including: Needs growth every 3 weeks and BPPs every week after 34 weeks for history of stillborn baby at term    -Level II Ultrasound abnormal results, follow up per [facility] growth every 3 weeks, weekly BPPs after 37 weeks  - Pre pregnancy BMI 19.7.   Pre Pregnancy Weight 128.  Height 5'7\".       OB visits with the [facility]: ., , , , 23, 5/3/23, , , Tdap 23  Hx of CS with stillborn baby    OB Intake: FOB/Asmerom is NOT involved and supportive.  5 years -  - he lives in another state.   He will come when she has the baby, he will not help with housing, money, food.   Yusra is interested in social work referral and MVNA referral     Dental concerns, Has needed to have 6 teeth extracted    Expecting a boy!  Last growth US 32/1 weeks, cephalic, posterior placenta, normal fluid, EFW 32%, 13%      Reviewed labs from the [facility]. All normal results except Hep B Non Immune,   Ferritin 16, iron binding capacity high  Hgb 12.4, 1 hour elevated, normal 3 " hour, 23 NILM HPV neg, pap       OTHER CONCERNS: History of traumatic birth    Yusra has a history of stillbirth at term in [facility] 2012. She had been having regular prenatal care throughout her pregnancy and had an uncomplicated pregnancy. When she had   onset of labor at home and called her doctor, her doctor told her to go to the closest birth center. She was 9cm upon arrival and was advised to start pushing by the two   providers who were there (she shared that they had identified themselves as doctors, but later found out that they were not). They gave her an episiotomy, one of the providers was pushing on her belly to help the baby be born.     A doctor was called to assist, at which time a fetal demise was diagnosed. She shared that the doctor told her that the providers should have called for help sooner. She underwent delivery by  section and was advised that she should have  section for future deliveries due to a narrow pelvis. Prenatal and delivery records prior the prior delivery are not available.     DENIES previous complications during pregnancy/birth such as SGA, IUGR,  labor with cervical dilation, gestational diabetes, shoulder dystocia, vacuum/forceps delivery, postpartum hemorrhage, gestational HTN, or preeclampsia.    MFM recommendation:   We discussed that given her history of stillbirth at term without a clear etiology that I would recommend close fetal surveillance in the current pregnancy with serial   ultrasounds to monitor fetal growth, as well as  surveillance. We also discussed the recommendation for delivery at 39 weeks gestation.   Serial ultrasounds (every 4 weeks) are recommended, as well as  surveillance beginning at around 32 weeks gestation given the history of IUFD at term. We will   plan to scheduled these appointments through our office.     Personal/social hx: Fled from [place] 6 months ago. Lives with cousins. Feels safe.  FOB is not involved. He was not wanting to be involved in the pregnancy. She feels safe in regards to him as well.   Employment: none. Speaks Chinese and some English.     GENETICS  - Genetic/Infection questionnaire completed, initiated care at 21 weeks    Have you traveled during the pregnancy? No  Have your sexual partner(s) travelled during the pregnancy? No    - Current Medications    Current Outpatient Medications   Medication Sig Dispense Refill    Prenatal Vit-Fe Fumarate-FA (PRENATAL MULTIVITAMIN W/IRON) 27-0.8 MG tablet Take 1 tablet by mouth daily at 2 pm      Vitamin D3 (CHOLECALCIFEROL) 25 mcg (1000 units) tablet Take 1 tablet by mouth daily      acetaminophen (TYLENOL) 500 MG tablet Take 500 mg by mouth (Patient not taking: Reported on 7/3/2023)      carboxymethylcellulose (REFRESH PLUS) 0.5 % SOLN ophthalmic solution Place 1 drop into both eyes 4 times daily (Patient not taking: Reported on 7/3/2023) 30 mL 11    carboxymethylcellulose PF (CARBOXYMETHYLCELLULOSE SODIUM) 0.5 % ophthalmic solution Place 1 drop into both eyes 3 times daily as needed for dry eyes (Patient not taking: Reported on 7/3/2023) 50 each 0         - Co-morbids  History reviewed. No pertinent past medical history.    - Risk for GDM -  has No known risk factors for GDM WILL NOT have an early GCT and possible Hgb A1C    - High Risk for Pre E-  Has No known risk factors of High risk for Pre E so WILL NOT start low dose aspirin (81mg) starting between 12 and 28 weeks to prevent early onset preeclampsia.    - Moderate risk - has moderate risk factors for Pre E including Sociodemographic characteristics (Racism, Less access given lower SES) and Personal history factors (e.g., low birthweight or small for gestational age, previous adverse pregnancy outcome, >10-year pregnancy interval)    Since she meets two  of the moderate risk facrtors for Pre E -   so would have been eligible to start low dose aspirin but this was not started by her  previous provider.     - The patient  does not have a history of spontaneous  birth so  WILL NOT consider progesterone starting at 16-20 weeks and/or serial transvaginal cervical length ultrasounds from 16-24 weeks.     -The patient does not have a history of immunosuppresion or HIV so Toxoplasma IgG/IgM WILL NOT be ordered.    PERSONAL/SOCIAL HISTORY  Partner is not involved,  [place]   he lives in another state and is not supportive.   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  History of anxiety or depression  Yes, PTSD, please follow up next visit    Additional items: has WIC, interested in MVNA and social work referral      PSYCHIATRIC: Has History of depression, anxiety and PTSD  PHQ-9 score:        7/3/2023     2:35 PM   PHQ-9 SCORE   PHQ-9 Total Score 1          No data to display                  Past History:  Her past medical history History reviewed. No pertinent past medical history..   She has a history of  stillbirth,  section  Since her last LMP she denies use of alcohol, tobacco and street drugs.  HISTORY:  Family History   Problem Relation Age of Onset    Breast Cancer Other     Colon Cancer Other     Diabetes Other     Hypertension Other      Social History     Socioeconomic History    Marital status: Patient Declined   Tobacco Use    Smoking status: Never    Smokeless tobacco: Never   Substance and Sexual Activity    Alcohol use: Never    Drug use: Never    Sexual activity: Yes     Current Outpatient Medications   Medication Sig    Prenatal Vit-Fe Fumarate-FA (PRENATAL MULTIVITAMIN W/IRON) 27-0.8 MG tablet Take 1 tablet by mouth daily at 2 pm    Vitamin D3 (CHOLECALCIFEROL) 25 mcg (1000 units) tablet Take 1 tablet by mouth daily    acetaminophen (TYLENOL) 500 MG tablet Take 500 mg by mouth (Patient not taking: Reported on 7/3/2023)    carboxymethylcellulose (REFRESH PLUS) 0.5 % SOLN ophthalmic  "solution Place 1 drop into both eyes 4 times daily (Patient not taking: Reported on 7/3/2023)    carboxymethylcellulose PF (CARBOXYMETHYLCELLULOSE SODIUM) 0.5 % ophthalmic solution Place 1 drop into both eyes 3 times daily as needed for dry eyes (Patient not taking: Reported on 7/3/2023)     No current facility-administered medications for this visit.     No Known Allergies    ============================================  MEDICAL HISTORY  History reviewed. No pertinent past medical history.  Past Surgical History:   Procedure Laterality Date    LASIK BILATERAL         OB History    Para Term  AB Living   2 1 1 0 0 0   SAB IAB Ectopic Multiple Live Births   0 0 0 0 0      # Outcome Date GA Lbr Chris/2nd Weight Sex Delivery Anes PTL Lv   2 Current            1 Term 19 40w0d   F CS-Unspec  N FD      Birth Comments: 23 Pt went into spontaneous labor, went to a birth center in [facility], was advised to start pushing at 9cm, had an episiotomy, doctor was called and baby  in utero. She was then advised to have a  section.      Obstetric Comments   23 Pt went into spontaneous labor, went to a birth center in [facility], was advised to start pushing at 9cm, had an episiotomy, doctor was called and baby  in utero. She was then advised to have a  section.    No GHTN, GDM,    Did have an episiotomy and postpartum hemorrhage           GYN History- No Abnormal Pap Smears                        Cervical procedures: None                        History of STI: none  Last pap 23 NILM HPV neg,    I personally reviewed the past social/family/medical and surgical history on the date of service.       ROS: 10 point ROS neg other than the symptoms noted above in the HPI.    Objective  /65   Pulse 89   Ht 1.73 m (5' 8.11\")   Wt 68.4 kg (150 lb 12.8 oz)   LMP 2022   BMI 22.86 kg/m       Physical Exam:  General: Pleasant, articulate, well-groomed, well-nourished female.  Not " in any apparent distress  Neck: Supple  Lungs:  nonlabored breathing pattern  Neuro: alert and oriented x 3     Assessment/Plan  33 year old , 34w5d weeks of pregnancy with SAVANNAH of Aug 9, 2023 by LMP/US  Intrauterine pregnancy 34w5d size  consistent with dates  Genetic Screening: initiated care too late for first tri screen    (Z87.59) History of stillbirth  (primary encounter diagnosis)  Plan: Will have a growth US every 3 weeks, BPP every week after 34 wks plan to induce at 39 weeks      (Z87.898) History of birth trauma, stillborn baby Ciarra, see note from 7/3/23  Plan: Review mental health needs next visit    (Z98.891) History of  delivery  Plan:   -Both vaginal delivery and  delivery risks and benefits were discussed with Yusra.    -Reviewed Yusra's delivery history and how it can impact this delivery and chance for success for a .  Discussed that her chance at successful vaginal birth is around 65%.  Reviewed the 1% risk of uterine rupture with either method of delivery, just different risks with each.  If the uterus does rupture she will need an emergency , the baby may experience trauma or death. This is very unlikely to occur.  -Discussed that the risk involved with a  section is higher for someone who has labored first.     -If she would like to have a TOLAC, advised that she come to the hospital sooner than if she hadn't had surgery in the past.    -Ideally we will avoid an induction of labor, but if this is indicated because of gestational age or a medical issue, Pitocin can be used. This would increase the risk of uterine rupture to about 1.9%.    -Recovery with vaginal delivery is usually easier than with  section and usually has a shorter stay in the hospital.    -Epidural Anesthesia is still an option if she is attempting a .   -Reviewed benefits to the baby with a vaginal delivery.    -Reviewed risks of surgery, including infection, bleeding, and  injury to adjacent organs. Discussed that this risk increases as the number of surgeries increases.    -During her labor process she can change her mind if she decides she wants a repeat  section.    -Reviewed expected hospital stay and recovery limitations following a  section, especially with lifting and driving.    -Baby's born by  have a slightly increased risk of transient tachypnea of the . This often resolves within 1-2 days.  -Advised that a scheduled  should be no earlier than 39 weeks gestation unless there is a medical issue that would make delivery earlier recommended.    - consent was signed after all questions were answered. Sent for scanning    Inadequate social support:  -Referred to social work to get access to resources and MVNA      - Oriented to Practice, types of care, and how to reach a provider.  Pt prefers CNM team  - Patient has BPPs and growth US scheduled with MFM    - Educational handout on the prevention of infections diseases during pregnancy provided.  - Reviewed healthy diet and foods to avoid, exercise and activity during pregnancy; avoiding exposure to toxoplasmosis; and maintenance of a generally healthy lifestyle.   - Discussed the harms, benefits, side effects and alternative therapies for current prescribed and OTC medications. Patient was encouraged to start prenatal vitamins as tolerated.    - Reviewed use of triage nurse line and contacting the on-call provider after hours for an urgent need such as fever, vagina bleeding, bladder or vaginal infection, rupture of membranes,  or term labor.      - Reviewed best evidence for: weight gain for her weight and height for pregnancy:  Based on pre-pregnancy 18  RECOMMENDED WEIGHT GAIN: 28-40    -   - Reviewed low risk for PTL/PTB, reviewed s/sx to watch for.  - Reviewed low risk for diabetes in pregnancy, will screen at 26-28 weeks   - Reviewed higher risk for pre-eclampsia due to  living [place] a [place] color in the [place] and hx of stillborn baby, however was not initiated on ASA previously so will not start now    - Pregnancy concerns to be addressed by provider at next OB visit include: mental health.     - All pt's questions discussed and answered.  Pt verbalized understanding of and agreement to plan of care.       - Continue scheduled prenatal care and prn if questions or concerns    Ila Mark CNM

## 2023-07-05 ENCOUNTER — DOCUMENTATION ONLY (OUTPATIENT)
Dept: CARE COORDINATION | Facility: CLINIC | Age: 33
End: 2023-07-05
Payer: COMMERCIAL

## 2023-07-05 NOTE — PROGRESS NOTES
"Received referral from [facility] with request for SW to contact patient to offer assessment of needs and to offer support. Phone call to patient this afternoon with language line [employer] .      Patient is 33 year-old Yusra Gonzales.  She is originally from [place].  She states she came to [place]  7 months ago. She spent 11 years in a refugee camp in [facility] before coming to the [place]  She lived briefly in [place] with her  but there was conflict in their relationship and they then .  Yusra then came to [place].  She initially identifies no support here but later mentions an aunt and cousins.    Yusra lives alone is a home that she rents in [place].  She hopes to move to a more comfortable place after baby is born.    Yusra knows the baby she carries is a boy.  SW offered support around the still birth of her last baby.  Yusra readily became tearful and states, \"I'm still hurting\".   Her grief lingers in this pregnancy and her fears and worries are ever-present as she approaches her due date.      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  These are morning hours.  She works at [employer] in the afternoons.  She continues to work now but anticipates she will stop working a week or so before her due date.      Yusra has [facility].  She has received cash and food assistance benefits before but is not eligible for these programs while she is working and has income.  NIK encouraged Yusra to reapply for these benefits when she is on leave and without income after baby is born.  Yusra is enrolled in WIC.  She will bring baby to the [facility] for primary care.  She has no baby supplies now.  Emotionally,  It is painful for Yusra to prepare for baby.  She did consent for NIK to submit referral to [employer] for an infant car seat, a breast pump, and  services.    NIK will offer portable crib " and diapers from [facility] when Yusra is in [facility].      SW provided my direct contact information and encouraged her to be in touch if she has other questions.    LENORA Walden [facility]  Clinical   Maternal Child Health  Phone:  [phone]  Pager:  [phone]

## 2023-07-07 ENCOUNTER — OFFICE VISIT (OUTPATIENT)
Dept: MATERNAL FETAL MEDICINE | Facility: CLINIC | Age: 33
End: 2023-07-07
Attending: OBSTETRICS & GYNECOLOGY
Payer: COMMERCIAL

## 2023-07-07 ENCOUNTER — HOSPITAL ENCOUNTER (OUTPATIENT)
Dept: ULTRASOUND IMAGING | Facility: CLINIC | Age: 33
Discharge: HOME | End: 2023-07-07
Attending: OBSTETRICS & GYNECOLOGY
Payer: COMMERCIAL

## 2023-07-07 DIAGNOSIS — Z87.59 HISTORY OF IUFD: Primary | ICD-10-CM

## 2023-07-07 DIAGNOSIS — O09.293 HISTORY OF STILLBIRTH IN CURRENTLY PREGNANT PATIENT, THIRD TRIMESTER: ICD-10-CM

## 2023-07-07 PROCEDURE — 76819 FETAL BIOPHYS PROFIL W/O NST: CPT | Mod: 26 | Performed by: OBSTETRICS & GYNECOLOGY

## 2023-07-07 PROCEDURE — 76819 FETAL BIOPHYS PROFIL W/O NST: CPT

## 2023-07-07 NOTE — NURSING NOTE
Patient reports positive fetal movement, denies contractions, leaking of fluid, or bleeding.  Reports occasional vaginal pressure. SBAR given to LARISA SHEARER, see their note in Epic.

## 2023-07-13 ENCOUNTER — HOSPITAL ENCOUNTER (OUTPATIENT)
Dept: ULTRASOUND IMAGING | Facility: CLINIC | Age: 33
Discharge: HOME | End: 2023-07-13
Attending: OBSTETRICS & GYNECOLOGY
Payer: COMMERCIAL

## 2023-07-13 ENCOUNTER — OFFICE VISIT (OUTPATIENT)
Dept: MATERNAL FETAL MEDICINE | Facility: CLINIC | Age: 33
End: 2023-07-13
Attending: OBSTETRICS & GYNECOLOGY
Payer: COMMERCIAL

## 2023-07-13 DIAGNOSIS — O09.293 HISTORY OF STILLBIRTH IN CURRENTLY PREGNANT PATIENT, THIRD TRIMESTER: ICD-10-CM

## 2023-07-13 DIAGNOSIS — O09.293 HISTORY OF STILLBIRTH IN CURRENTLY PREGNANT PATIENT, THIRD TRIMESTER: Primary | ICD-10-CM

## 2023-07-13 PROCEDURE — 76819 FETAL BIOPHYS PROFIL W/O NST: CPT | Mod: 26 | Performed by: OBSTETRICS & GYNECOLOGY

## 2023-07-13 PROCEDURE — 76819 FETAL BIOPHYS PROFIL W/O NST: CPT

## 2023-07-13 NOTE — NURSING NOTE
Patient reports positive fetal movement, no pain, no contractions, leaking of fluid, or bleeding.  Patient denies headache, visual changes, nausea/vomiting, epigastric pain related to preeclampsia.  Education provided to patient on BPP.  SBAR given to LARISA SHEARER, see their note in Epic.  Deanne Juan RN

## 2023-07-14 ENCOUNTER — PRENATAL OFFICE VISIT (OUTPATIENT)
Dept: OBGYN | Facility: CLINIC | Age: 33
End: 2023-07-14
Attending: ADVANCED PRACTICE MIDWIFE
Payer: COMMERCIAL

## 2023-07-14 ENCOUNTER — APPOINTMENT (OUTPATIENT)
Dept: LAB | Facility: CLINIC | Age: 33
End: 2023-07-14
Attending: ADVANCED PRACTICE MIDWIFE
Payer: COMMERCIAL

## 2023-07-14 VITALS
SYSTOLIC BLOOD PRESSURE: 100 MMHG | BODY MASS INDEX: 23.05 KG/M2 | WEIGHT: 152.1 LBS | HEIGHT: 68 IN | DIASTOLIC BLOOD PRESSURE: 67 MMHG | HEART RATE: 85 BPM

## 2023-07-14 DIAGNOSIS — N90.810 FEMALE CIRCUMCISION: ICD-10-CM

## 2023-07-14 DIAGNOSIS — Z98.891 HISTORY OF CESAREAN DELIVERY: ICD-10-CM

## 2023-07-14 DIAGNOSIS — Z87.898 HISTORY OF BIRTH TRAUMA: ICD-10-CM

## 2023-07-14 DIAGNOSIS — Z65.8 INADEQUATE SOCIAL SUPPORT: ICD-10-CM

## 2023-07-14 DIAGNOSIS — O09.90 HIGH RISK PREGNANCY, ANTEPARTUM: Primary | ICD-10-CM

## 2023-07-14 LAB
ERYTHROCYTE [DISTWIDTH] IN BLOOD BY AUTOMATED COUNT: 12.6 % (ref 10–15)
HCT VFR BLD AUTO: 38 % (ref 35–47)
HGB BLD-MCNC: 13 G/DL (ref 11.7–15.7)
MCH RBC QN AUTO: 31.9 PG (ref 26.5–33)
MCHC RBC AUTO-ENTMCNC: 34.2 G/DL (ref 31.5–36.5)
MCV RBC AUTO: 93 FL (ref 78–100)
PLATELET # BLD AUTO: 167 10E3/UL (ref 150–450)
RBC # BLD AUTO: 4.07 10E6/UL (ref 3.8–5.2)
WBC # BLD AUTO: 9.9 10E3/UL (ref 4–11)

## 2023-07-14 PROCEDURE — 87653 STREP B DNA AMP PROBE: CPT | Performed by: ADVANCED PRACTICE MIDWIFE

## 2023-07-14 PROCEDURE — 99207 PR PRENATAL VISIT: CPT | Performed by: ADVANCED PRACTICE MIDWIFE

## 2023-07-14 PROCEDURE — 36415 COLL VENOUS BLD VENIPUNCTURE: CPT | Performed by: ADVANCED PRACTICE MIDWIFE

## 2023-07-14 PROCEDURE — 85014 HEMATOCRIT: CPT | Performed by: ADVANCED PRACTICE MIDWIFE

## 2023-07-14 PROCEDURE — G0463 HOSPITAL OUTPT CLINIC VISIT: HCPCS | Performed by: ADVANCED PRACTICE MIDWIFE

## 2023-07-14 NOTE — PATIENT INSTRUCTIONS
Labor and Childbirth: Support Person's Notes  You may be excited and anxious about the impending labor and childbirth. You may also wonder how you can help. Learning about the birth process can help you know what to expect. And following the suggestions below can help ease you and the mother through this exciting time.   During early labor  Be sure to time the contractions.  Keep the setting soothing. Dim lights and prevent loud noises. Try playing relaxing music.  Suggest that the mother soak in a warm tub to ease the pain of contractions.  Try to distract the mother from the contractions with a short walk or massage.  Encourage the mother to rest if she's tired.  As contractions become stronger, help her use labor breathing techniques.    During active labor  Have the mother walk or change position at least once an hour. This improves circulation and helps the baby descend.  Keep reminding the mother to breathe and relax through each contraction.  Reassure her. Try to keep her from getting anxious or overstressed.  Take care of yourself. Take a short break to eat or go to the bathroom when you need to.  Rest when the mother does. You'll both benefit.    During a vaginal birth  Help the mother into a pushing position. Support her body as she pushes. A semi-sitting or semi-squatting position allows gravity to assist the birth.  Remind her to rest between contractions. Encourage her by telling her when the baby's head appears.  Keep in mind that you may be masked and gowned for the birth, depending on hospital policy.    During a  birth  You will most likely be able to stay with the mother during the . If you remain with her, you'll wear a mask and surgical gown.  Remember that  birth is surgery. The mother's abdominal area will be draped and out of view. Don't touch the draped areas, which are sterile.  If you aren't allowed to attend the delivery or aren't comfortable doing so, wait with  other friends and family members in the family waiting area.    [employer] last reviewed this educational content on 7/1/2021 2000-2023 [employer]. All rights reserved. This information is not intended as a substitute for professional medical care. Always follow your healthcare professional's instructions.      Thank you for trusting us with your care!     If you need to contact us for questions about:  Symptoms, Scheduling & Medical Questions; Non-urgent (2-3 day response) [place] message, Urgent (needing response today) [phone] (if after 3:30pm next day response)   Prescriptions: Please call your Pharmacy   Billing: [place] [phone] or  Physicians:[phone]

## 2023-07-14 NOTE — PROGRESS NOTES
"Subjective:      33 year old  at 36w2d presents for a routine prenatal appointment. A professional Chinese  was used with an Ipad    Accepts GBS today.    Accepts CBC assessment today  Baby is cephalic by [place]    Weekly BPPs and growth US q 3 weeks for hx of stillbirth, IOL at 37/0-39/6  Weeks. 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 Grateful for this resource.  Hx of anxiety/depression/PTSD - feeling stable now. Smiling during today's visit.            Denies vaginal bleeding,  leakage of fluid, or change in vaginal discharge.  Occasional contractions, sometimes feels pressure, no regular contractions.  Good fetal movement.     No HA, visual changes, RUQ or epigastric pain.     Patient concerns:   Feeling well overall.   Objective:  Vitals:    23 1501   BP: 100/67   Pulse: 85   Weight: 69 kg (152 lb 1.6 oz)   Height: 1.73 m (5' 8.11\")    See OB flowsheet  Physical Exam:  General Appearance: Alert, cooperative, no distress, appears stated age  Head: Normocephalic, without obvious abnormality, atraumatic  Abdomen: Soft, non-tender, no masses.   FHT: 145, FH 35   Vulva: No evidence of FGC, no sign of lesions or condyloma, normal hair distribution  Vagina: pink, normal rugae, no abnormal discharge  Cervix: 1cm/50%-2 negative CMT with exam  Uterus: mid position, non tender, enlarged " approximately 35 weeks size  Extremities: Extremities normal, atraumatic, no cyanosis or edema  Skin: Skin color, texture, turgor normal, no rashes or lesions  Lymph nodes: Cervical, supraclavicular, and axillary nodes normal  Neurologic: Alert and oriented x 3.      Assessment/Plan     Encounter Diagnoses   Name Primary?     High risk pregnancy, antepartum Yes     Female circumcision      History of birth trauma      History of  delivery      Inadequate social support      No orders of the defined types were placed in this encounter.    No orders of the defined types were placed in this encounter.          7/3/2023     2:35 PM   PHQ-9 SCORE   PHQ-9 Total Score 1       GBS screening: Obtained.  Birth preferences reviewed: Would like to be induced at 39/6 weeks to give her a chance to go into labor on her own. Scheduled for an induction of labor 23 at 7:30pm for a Sher Balloon placement, IOL for 23 will stay overnight given hx of CS  -Reviewed with Yusra that her vulva appear to be anatomically normal  Labor signs discussed. Reinforced daily fetal movement counts.  Reviewed why/how to contact provider if headache/visual changes/RUQ or epigastric pain, decreased fetal movement, vaginal bleeding, leakage of fluid.   Return to clinic in 1 week and prn if questions or concerns.     Ila Mark CNM

## 2023-07-15 LAB — GP B STREP DNA SPEC QL NAA+PROBE: NEGATIVE

## 2023-07-20 ENCOUNTER — HOSPITAL ENCOUNTER (OUTPATIENT)
Dept: ULTRASOUND IMAGING | Facility: CLINIC | Age: 33
Discharge: HOME | End: 2023-07-20
Attending: OBSTETRICS & GYNECOLOGY
Payer: COMMERCIAL

## 2023-07-20 ENCOUNTER — OFFICE VISIT (OUTPATIENT)
Dept: MATERNAL FETAL MEDICINE | Facility: CLINIC | Age: 33
End: 2023-07-20
Attending: OBSTETRICS & GYNECOLOGY
Payer: COMMERCIAL

## 2023-07-20 DIAGNOSIS — O09.293 HISTORY OF STILLBIRTH IN CURRENTLY PREGNANT PATIENT, THIRD TRIMESTER: ICD-10-CM

## 2023-07-20 DIAGNOSIS — Z87.59 HISTORY OF IUFD: Primary | ICD-10-CM

## 2023-07-20 PROCEDURE — 76819 FETAL BIOPHYS PROFIL W/O NST: CPT | Mod: 26 | Performed by: OBSTETRICS & GYNECOLOGY

## 2023-07-20 PROCEDURE — 76816 OB US FOLLOW-UP PER FETUS: CPT | Mod: 26 | Performed by: OBSTETRICS & GYNECOLOGY

## 2023-07-20 PROCEDURE — 76816 OB US FOLLOW-UP PER FETUS: CPT

## 2023-07-20 PROCEDURE — 76819 FETAL BIOPHYS PROFIL W/O NST: CPT

## 2023-07-20 NOTE — PROGRESS NOTES
",Please see \"Imaging\" tab under \"Chart Review\" for details of today's US at the [facility].    Jose Washington MD  Maternal-Fetal Medicine      "

## 2023-07-20 NOTE — NURSING NOTE
Ipad "[employer]  used for MFM ultrasound and office visit.  Patient reports positive fetal movement, no pain, no contractions, leaking of fluid, or bleeding.  SBAR given to MFM MD, see their note in Epic.

## 2023-07-28 ENCOUNTER — OFFICE VISIT (OUTPATIENT)
Dept: MATERNAL FETAL MEDICINE | Facility: CLINIC | Age: 33
End: 2023-07-28
Attending: OBSTETRICS & GYNECOLOGY
Payer: COMMERCIAL

## 2023-07-28 ENCOUNTER — HOSPITAL ENCOUNTER (OUTPATIENT)
Dept: ULTRASOUND IMAGING | Facility: CLINIC | Age: 33
Discharge: HOME | End: 2023-07-28
Attending: OBSTETRICS & GYNECOLOGY
Payer: COMMERCIAL

## 2023-07-28 DIAGNOSIS — O09.293 HISTORY OF STILLBIRTH IN PREGNANT PATIENT IN THIRD TRIMESTER, ANTEPARTUM: Primary | ICD-10-CM

## 2023-07-28 DIAGNOSIS — Z87.59 HISTORY OF IUFD: ICD-10-CM

## 2023-07-28 PROCEDURE — 76819 FETAL BIOPHYS PROFIL W/O NST: CPT | Mod: 26 | Performed by: OBSTETRICS & GYNECOLOGY

## 2023-07-28 PROCEDURE — 76819 FETAL BIOPHYS PROFIL W/O NST: CPT

## 2023-07-28 NOTE — NURSING NOTE
Patient reports positive fetal movement, no pain, no contractions, leaking of fluid, or bleeding.   SBAR given to LARISA SHEARER, see their note in Epic.

## 2023-07-28 NOTE — PROGRESS NOTES
The patient was seen for an ultrasound in the Maternal-Fetal Medicine Center at the [facility] today.  For a detailed report of the ultrasound examination, please see the ultrasound report which can be found under the imaging tab.    If you have questions regarding today's evaluation or if we can be of further service, please contact the Maternal-Fetal Medicine Center.    Janae Stanton MD  , OB/GYN  Maternal-Fetal Medicine  [phone] (Pager)

## 2023-07-31 ENCOUNTER — PRENATAL OFFICE VISIT (OUTPATIENT)
Dept: OBGYN | Facility: CLINIC | Age: 33
End: 2023-07-31
Attending: ADVANCED PRACTICE MIDWIFE
Payer: COMMERCIAL

## 2023-07-31 VITALS
WEIGHT: 153.7 LBS | HEIGHT: 68 IN | DIASTOLIC BLOOD PRESSURE: 66 MMHG | HEART RATE: 86 BPM | BODY MASS INDEX: 23.3 KG/M2 | SYSTOLIC BLOOD PRESSURE: 100 MMHG

## 2023-07-31 DIAGNOSIS — Z87.898 HISTORY OF BIRTH TRAUMA: ICD-10-CM

## 2023-07-31 DIAGNOSIS — Z65.8 INADEQUATE SOCIAL SUPPORT: ICD-10-CM

## 2023-07-31 DIAGNOSIS — O09.90 HIGH RISK PREGNANCY, ANTEPARTUM: Primary | ICD-10-CM

## 2023-07-31 DIAGNOSIS — Z98.891 HISTORY OF CESAREAN DELIVERY: ICD-10-CM

## 2023-07-31 PROBLEM — N90.810 FEMALE CIRCUMCISION: Status: RESOLVED | Noted: 2023-02-13 | Resolved: 2023-07-31

## 2023-07-31 PROCEDURE — G0463 HOSPITAL OUTPT CLINIC VISIT: HCPCS | Performed by: ADVANCED PRACTICE MIDWIFE

## 2023-07-31 PROCEDURE — 99207 PR PRENATAL VISIT: CPT | Performed by: ADVANCED PRACTICE MIDWIFE

## 2023-07-31 NOTE — PATIENT INSTRUCTIONS
[employer] offers some scholarships for free postpartum doulas. You can view their website here. [email or web address]    You can apply for a postpartum  using this form:    https://forms.gle/[email or web address]    Or call     [phone]   Week 38 of Your Pregnancy: Care Instructions  Believe it or not, your baby is almost here. You may notice how your baby responds to sounds, warmth, cold, and light. You may even know what kind of music your baby likes.    Even if you expect a vaginal birth, it's a good idea to learn about  section ().  is the delivery of a baby through a cut (incision) in your belly. It's a major surgery, so it has more risks than a vaginal birth.   During the first 2 weeks after the birth, limit visitors. Don't allow visitors who have colds or infections. Ask visitors to wash their hands before they touch your baby. And never let anyone smoke around your baby.     Know about unplanned C-sections.  Reasons for an unplanned  include labor that slows or stops, signs of distress in your baby, and high blood pressure or other problems for you.     Know about planned C-sections.  If your baby isn't in a head-down position for delivery (breech position), your doctor may plan a . Or you may have a planned  if you're having twins or more.     Get as much help as you can while you're in the hospital.  You can learn about feeding, diapering, and bathing your baby.     Talk to your doctor or midwife about how to care for the umbilical cord stump.  If your baby will be circumcised, also ask about how to care for that.     Ask friends or family for help, as you need it.  If you can, have another adult in your home for at least 2 or 3 days after the birth.     Try to nap when your baby naps.  This may be your best chance to get some sleep.     Watch for changes in your mental health.  For the first 1 to 2 weeks after birth, it's  "common to cry or feel sad or irritable. If these feelings last for more than 2 weeks, you may have postpartum depression. Ask your doctor for help. Postpartum depression can be treated.   Follow-up care is a key part of your treatment and safety. Be sure to make and go to all appointments, and call your doctor if you are having problems. It's also a good idea to know your test results and keep a list of the medicines you take.  Where can you learn more?  Go to https://www.[employer].net/patiented  Enter B044 in the search box to learn more about \"Week 38 of Your Pregnancy: Care Instructions.\"  Current as of: 2022               Content Version: 13.7    7363-6380 [employer].   Care instructions adapted under license by your healthcare professional. If you have questions about a medical condition or this instruction, always ask your healthcare professional. [employer] disclaims any warranty or liability for your use of this information.      Week 39 of Your Pregnancy: Care Instructions     babies can look different than what you see in pictures or movies. Their heads can be a strange shape right after birth. And they may have swollen eyes and red marks on their faces.   You can still get pregnant even if you are breastfeeding. If you don't want to get pregnant, talk to your doctor about birth control.   Tips for week 39 of pregnancy  If you plan to breastfeed, get prepared.     Continue to eat healthy foods.  Keep taking your prenatal vitamins during breastfeeding if your doctor recommends it.  Talk to your doctor before taking any medicines or supplements.  Choose the right birth control for you.     Intrauterine devices (IUDs) are placed in the uterus. Sometimes the IUD can be placed right after giving birth. They work for years.  Hormonal implants are placed under the skin of the arm. They also work for years.  Depo-Provera is a shot. You get it every 3 months.  Birth " "control pills can be used. They're taken every day.  Tubal ligation (tying your tubes) and vasectomy are surgeries. They're permanent.  Diaphragms, spermicide, and condoms must be used each time you have sex. If you used a diaphragm before, you should get refitted after the baby is born.  A birth control patch or ring can be used. These just can't be started until several weeks after you give birth.  Follow-up care is a key part of your treatment and safety. Be sure to make and go to all appointments, and call your doctor if you are having problems. It's also a good idea to know your test results and keep a list of the medicines you take.  Where can you learn more?  Go to https://www.[employer]/patiented  Enter A811 in the search box to learn more about \"Week 39 of Your Pregnancy: Care Instructions.\"  Current as of: 2022               Content Version: 13.7    8035-2235 [employer].   Care instructions adapted under license by your healthcare professional. If you have questions about a medical condition or this instruction, always ask your healthcare professional. [employer] disclaims any warranty or liability for your use of this information.      Weeks 40 to 41 of Your Pregnancy: Care Instructions  By week 40, you've reached your due date. Your baby could be coming any day. Most babies born after their due dates are healthy. But you may have tests to make sure everything is okay. If you feel stressed, you could try a meditation exercise, such as guided imagery. It may help you relax.    If you are past 41 weeks, your doctor may measure the amount of fluid that surrounds your baby. They may also test your baby's movement and heart rate.   If you don't start labor on your own by 41 or 42 weeks, your doctor may want to start (induce) labor. If there are other concerns, your doctor may talk to you about a .   To start (induce) your labor, your doctor may do any of " "these things.    Place a narrow tube with a small balloon on the end (balloon catheter) into your cervix.  The doctor inflates the balloon. This helps your cervix open (dilate).     Sweep the membranes (separate the lining of the amniotic sac from the uterus).  This helps the uterus make a chemical that can start contractions.     \"Break your water\" (rupture the amniotic sac).  This may be done if your cervix has started to open.     Use medicines.  They may be used to soften the cervix or start contractions.   How to do guided imagery    Close your eyes, and take a few deep breaths. Picture a peaceful setting, such as a beach or a meadow. Add a winding path. Follow the path until you feel more and more relaxed.   Take a few minutes to breathe slowly and feel the calm. When you are ready, slowly take yourself back to the present. Count to 3, and open your eyes.   Follow-up care is a key part of your treatment and safety. Be sure to make and go to all appointments, and call your doctor if you are having problems. It's also a good idea to know your test results and keep a list of the medicines you take.  Where can you learn more?  Go to https://www.[employer].net/patiented  Enter T922 in the search box to learn more about \"Weeks 40 to 41 of Your Pregnancy: Care Instructions.\"  Current as of: November 9, 2022               Content Version: 13.7    0129-0946 [employer]   Care instructions adapted under license by your healthcare professional. If you have questions about a medical condition or this instruction, always ask your healthcare professional. [employer] disclaims any warranty or liability for your use of this information.      Labor Induction  What You Need to Know  What is labor induction?  In most cases, it is best to go into labor naturally. When labor does not start on its own, we may use medicine or other methods to start (induce) labor. This is called induction, which:  Helps " "the uterus contract  Thins, softens and opens the cervix (opening of the uterus)  When is induction used?  There are two types of induction.  Medical induction may be done to protect the health of the parent or baby if:  There are medical concerns for you or your baby.  You haven't had your baby by 41 weeks.  Induction for non-medical reasons may be done at 39 weeks or later if:  You live far from the hospital.  You've been having contractions for many days.  You've given birth quickly in the past.   We will not perform an induction for non-medical reasons before 39 weeks. Studies show that babies born before 39 weeks may struggle with breathing, feeding, sleeping and staying warm. They are more likely to have health problems and may need to stay in the hospital longer. If we start your labor for medical reasons, the benefits will outweigh these risks. We will talk to you about your risks, benefits and alternatives (other options) before we start your labor.  How is induction done?  We may start your labor by doing one or more of the following:  We may need to help your cervix soften and open (sometimes called \"ripening\") to prepare it for labor. There are two ways to do this:  Medicines--these may be in the form of pills that you swallow or medicines that are put into the vagina next to the cervix.  Mechanical--using either a single or double balloon. These are small balloons that are attached to tubes that go up inside the cervix. The balloons are then filled with water to put pressure on the cervix and help the cervix soften and open. Depending on the type of balloon used, you may be allowed to go home after it is placed.  After your cervix is ready:  We may give you medicine through an IV (a small tube placed in your vein). This medicine is called Pitocin. It helps the muscles of your uterus to contract.  We may make a small opening in your bag of water (the sac around your baby). This is called an amniotomy. " "Sometimes called \"breaking the water\". It may help your uterus contract and your cervix open.  What might happen if my labor is induced?  Some of this depends on how your labor is started and how your body responds. Your labor may be more complicated. You and your baby may need more medical treatments. In general:  You may not go into labor right away. If so, we may send you home with follow-up instructions.  It will be important to monitor you and your baby during the induction.  You may not be able to move around during labor. You will have two belts with monitors attached to your belly. These allow the nurse to watch your contractions and your baby's heart rate.  Your labor may take longer than if you went into labor on your own. It might take more than one day.  If you need cervical ripening, it is important to know that it may be many hours (even days) until delivery happens.  Cervical ripening can be slow and require several doses before your body is ready to labor.  A long labor may increase the risk of infection in mother and baby.  Your labor may not progress as planned. This could lead to a  birth.  Can I plan the date of my delivery?  After 39 weeks, you may ask about planning your delivery date. This is only an option if your body--and your baby--are ready. To find out, we will check your cervix and your baby's heart rate.   If you are ready to be induced, we will give you a date and time to come to the hospital. If many patients are in labor that day, we may need to start your labor at another time.  If you are not ready, we will not start your labor. It will be safer for your baby to come on its own.  What else do I need to know?  Before you have an induction, make sure you understand the reasons, risks and benefits. Ask your doctor or nurse-midwife:  Why do I need to be induced?  What are the risks to my baby?  How will you start my labor?  How will you know if my baby is ready to be " born?  How will you know if my body is ready to give birth?  Where can I get more information?  To learn more about induction, you may visit these websites:  The American College of Nurse-Midwives:  www.mymidwife.org  The American College of Obstetricians and Gynecologists: www.acog.org  Childbirth Connection:  www.childbirthconnection.org  March of Dimes: www.marchofdimes.Rent.com  Association of Women's Health, Obstetrics, and  Nursing  www.ydyoia4zct.org/go-the-full-40&#047;  For informational purposes only. Not to replace the advice of your health care provider. Copyright   2008 [employer]. All rights reserved. Clinically reviewed by the  System Operations Leadership team. [address] [employer] I [id number] - REV .      Thank you for trusting us with your care!     If you need to contact us for questions about:  Symptoms, Scheduling & Medical Questions; Non-urgent (2-3 day response) Pingboard message, Urgent (needing response today) [phone] (if after 3:30pm next day response)   Prescriptions: Please call your Pharmacy   Billing: [facility] [phone] or  Physicians:[phone]

## 2023-07-31 NOTE — PROGRESS NOTES
"Subjective:     33 year old  at 38w5d presents for routine prenatal visit.           GBS negative  Hgb 13.0    Weekly BPP and growth every 3 weeks for hx of stillbirth  Last BPP: 23: 38/2, cephalic, posterior placenta,     Planning an induction of labor at 39 weeks  at 7:30pm, would like a manzanares bulb placement that evening and start the induction the next day  Next US with MFM is 8/3/23. Would like to schedule an appointment 23 before her scheduled induction    Hep B NI due 23  PP contraception: Not acceptable in her Mandaeism, will not use any method. Would accept another pregnancy if she became pregnant  Feeding plan: breastfeed   Birth plan: [place] told Yusra he would be here (coming from [email or web address]), she is not sure if he will come  PP support: Does not have any support, interested in a volunteer postpartum      Denies vaginal bleeding or leakage of fluid.  Good contractions.  Good fetal movement.        No HA, visual changes, RUQ or epigastric pain.   Patient concerns:   Feeling well overall.  Objective:  Vitals:    23 1514   BP: 100/66   Pulse: 86   Weight: 69.7 kg (153 lb 11.2 oz)   Height: 1.73 m (5' 8.11\")    See OB flowsheet  Assessment/Plan     Encounter Diagnoses   Name Primary?    High risk pregnancy, antepartum Yes    History of birth trauma     History of  delivery     Inadequate social support      No orders of the defined types were placed in this encounter.    No orders of the defined types were placed in this encounter.    -Requested RN team to help Yusra find a postpartum  to support her after her birth with [employer]  -Has a BPP scheduled for 8/3/23  -IOL 23 for manzanares bulb placement and IOL the next day    - Reviewed why/how to contact provider if headache/visual changes/RUQ or epigastric pain, decreased fetal movement, vaginal bleeding, leakage of fluid or strong/regular contractions.   Patient education/orders or handouts " today:    Return to clinic in 1 week and prn if questions or concerns.   Ila Mark CNM

## 2023-07-31 NOTE — PROGRESS NOTES
Assisted patient with applying for [facility], provided phone number and website.

## 2023-08-01 ENCOUNTER — DOCUMENTATION ONLY (OUTPATIENT)
Dept: CARE COORDINATION | Facility: CLINIC | Age: 33
End: 2023-08-01
Payer: COMMERCIAL

## 2023-08-01 NOTE — PROGRESS NOTES
This writer received a referral from the clinic.  Chart review indicated Christinejerod Deng Abhinav is following this patient and has provided resource information.  This writer left a message for patient that Christine is out of the office this week but will follow up next week when she returns.    Julissa TERRAZAS, MSW, [facility]  Maternal Child Health   [phone]--office  [phone]--pager

## 2023-08-03 ENCOUNTER — OFFICE VISIT (OUTPATIENT)
Dept: MATERNAL FETAL MEDICINE | Facility: CLINIC | Age: 33
End: 2023-08-03
Attending: OBSTETRICS & GYNECOLOGY
Payer: COMMERCIAL

## 2023-08-03 ENCOUNTER — HOSPITAL ENCOUNTER (OUTPATIENT)
Dept: ULTRASOUND IMAGING | Facility: CLINIC | Age: 33
Discharge: HOME | End: 2023-08-03
Attending: OBSTETRICS & GYNECOLOGY
Payer: COMMERCIAL

## 2023-08-03 DIAGNOSIS — O09.293 HISTORY OF STILLBIRTH IN PREGNANT PATIENT IN THIRD TRIMESTER, ANTEPARTUM: Primary | ICD-10-CM

## 2023-08-03 DIAGNOSIS — Z87.59 HISTORY OF IUFD: ICD-10-CM

## 2023-08-03 PROCEDURE — 76819 FETAL BIOPHYS PROFIL W/O NST: CPT

## 2023-08-03 PROCEDURE — 76819 FETAL BIOPHYS PROFIL W/O NST: CPT | Mod: 26 | Performed by: OBSTETRICS & GYNECOLOGY

## 2023-08-04 NOTE — PATIENT INSTRUCTIONS
"Week 39 of Your Pregnancy: Care Instructions     babies can look different than what you see in pictures or movies. Their heads can be a strange shape right after birth. And they may have swollen eyes and red marks on their faces.   You can still get pregnant even if you are breastfeeding. If you don't want to get pregnant, talk to your doctor about birth control.   Tips for week 39 of pregnancy  If you plan to breastfeed, get prepared.     Continue to eat healthy foods.  Keep taking your prenatal vitamins during breastfeeding if your doctor recommends it.  Talk to your doctor before taking any medicines or supplements.  Choose the right birth control for you.     Intrauterine devices (IUDs) are placed in the uterus. Sometimes the IUD can be placed right after giving birth. They work for years.  Hormonal implants are placed under the skin of the arm. They also work for years.  Depo-Provera is a shot. You get it every 3 months.  Birth control pills can be used. They're taken every day.  Tubal ligation (tying your tubes) and vasectomy are surgeries. They're permanent.  Diaphragms, spermicide, and condoms must be used each time you have sex. If you used a diaphragm before, you should get refitted after the baby is born.  A birth control patch or ring can be used. These just can't be started until several weeks after you give birth.  Follow-up care is a key part of your treatment and safety. Be sure to make and go to all appointments, and call your doctor if you are having problems. It's also a good idea to know your test results and keep a list of the medicines you take.  Where can you learn more?  Go to https://www.[employer].net/patiented  Enter A811 in the search box to learn more about \"Week 39 of Your Pregnancy: Care Instructions.\"  Current as of: 2022               Content Version: 13.7    1388-4441 [employer].   Care instructions adapted under license by your healthcare " "professional. If you have questions about a medical condition or this instruction, always ask your healthcare professional. [employer] disclaims any warranty or liability for your use of this information.      Weeks 40 to 41 of Your Pregnancy: Care Instructions  By week 40, you've reached your due date. Your baby could be coming any day. Most babies born after their due dates are healthy. But you may have tests to make sure everything is okay. If you feel stressed, you could try a meditation exercise, such as guided imagery. It may help you relax.    If you are past 41 weeks, your doctor may measure the amount of fluid that surrounds your baby. They may also test your baby's movement and heart rate.   If you don't start labor on your own by 41 or 42 weeks, your doctor may want to start (induce) labor. If there are other concerns, your doctor may talk to you about a .   To start (induce) your labor, your doctor may do any of these things.    Place a narrow tube with a small balloon on the end (balloon catheter) into your cervix.  The doctor inflates the balloon. This helps your cervix open (dilate).     Sweep the membranes (separate the lining of the amniotic sac from the uterus).  This helps the uterus make a chemical that can start contractions.     \"Break your water\" (rupture the amniotic sac).  This may be done if your cervix has started to open.     Use medicines.  They may be used to soften the cervix or start contractions.   How to do guided imagery    Close your eyes, and take a few deep breaths. Picture a peaceful setting, such as a beach or a meadow. Add a winding path. Follow the path until you feel more and more relaxed.   Take a few minutes to breathe slowly and feel the calm. When you are ready, slowly take yourself back to the present. Count to 3, and open your eyes.   Follow-up care is a key part of your treatment and safety. Be sure to make and go to all appointments, and call " "your doctor if you are having problems. It's also a good idea to know your test results and keep a list of the medicines you take.  Where can you learn more?  Go to https://www.[employer].net/patiented  Enter T922 in the search box to learn more about \"Weeks 40 to 41 of Your Pregnancy: Care Instructions.\"  Current as of: November 9, 2022               Content Version: 13.7    2837-6994 [employer].   Care instructions adapted under license by your healthcare professional. If you have questions about a medical condition or this instruction, always ask your healthcare professional. [employer] disclaims any warranty or liability for your use of this information.    Thank you for trusting us with your care!     If you need to contact us for questions about:  Symptoms, Scheduling & Medical Questions; Non-urgent (2-3 day response) Raydiance message, Urgent (needing response today) [phone] (if after 3:30pm next day response)   Prescriptions: Please call your Pharmacy   Billing: [facility] [phone] or  Dina:[phone]   "

## 2023-08-07 ENCOUNTER — PRENATAL OFFICE VISIT (OUTPATIENT)
Dept: OBGYN | Facility: CLINIC | Age: 33
End: 2023-08-07
Attending: ADVANCED PRACTICE MIDWIFE
Payer: COMMERCIAL

## 2023-08-07 VITALS
HEIGHT: 68 IN | WEIGHT: 156 LBS | BODY MASS INDEX: 23.64 KG/M2 | HEART RATE: 73 BPM | SYSTOLIC BLOOD PRESSURE: 105 MMHG | DIASTOLIC BLOOD PRESSURE: 70 MMHG

## 2023-08-07 DIAGNOSIS — Z23 NEED FOR IMMUNIZATION AGAINST VIRAL HEPATITIS: ICD-10-CM

## 2023-08-07 DIAGNOSIS — O09.93 HRP (HIGH RISK PREGNANCY), THIRD TRIMESTER: Primary | ICD-10-CM

## 2023-08-07 PROCEDURE — 59425 ANTEPARTUM CARE ONLY: CPT | Performed by: ADVANCED PRACTICE MIDWIFE

## 2023-08-07 PROCEDURE — G0463 HOSPITAL OUTPT CLINIC VISIT: HCPCS | Performed by: ADVANCED PRACTICE MIDWIFE

## 2023-08-07 NOTE — PROGRESS NOTES
"Subjective:     33 year old  at 39w5d presents for routine prenatal visit.            Denies vaginal bleeding or leakage of fluid.  Denies regular contractions.  Reports regular fetal movement.        No HA, visual changes, RUQ or epigastric pain.   Patient concerns: Feeling more pelvic pressure  Feeling well overall.  Reports completing Hep B vaccine series.  Declines dose today.  Objective:  Vitals:    23 1532   BP: 105/70   Pulse: 73   Weight: 70.8 kg (156 lb)   Height: 1.73 m (5' 8.11\")    See OB flowsheet  Assessment/Plan     Encounter Diagnosis   Name Primary?    HRP (high risk pregnancy), third trimester Yes     Plans to return to hospital on 2023 for planned induction, plans admission for manzanares induction.  - Reviewed why/how to contact provider if headache/visual changes/RUQ or epigastric pain, decreased fetal movement, vaginal bleeding, leakage of fluid or strong/regular contractions.    GEOVANY Bradley CNM     "

## 2023-08-08 ENCOUNTER — HOSPITAL ENCOUNTER (INPATIENT)
Facility: CLINIC | Age: 33
LOS: 4 days | Discharge: HOME | End: 2023-08-12
Attending: ADVANCED PRACTICE MIDWIFE | Admitting: ADVANCED PRACTICE MIDWIFE
Payer: COMMERCIAL

## 2023-08-08 DIAGNOSIS — Z98.891 STATUS POST CESAREAN SECTION: Primary | ICD-10-CM

## 2023-08-08 LAB
ABO/RH(D): NORMAL
ANTIBODY SCREEN: NEGATIVE
BASOPHILS # BLD AUTO: 0 10E3/UL (ref 0–0.2)
BASOPHILS NFR BLD AUTO: 0 %
EOSINOPHIL # BLD AUTO: 0.1 10E3/UL (ref 0–0.7)
EOSINOPHIL NFR BLD AUTO: 1 %
ERYTHROCYTE [DISTWIDTH] IN BLOOD BY AUTOMATED COUNT: 12.8 % (ref 10–15)
HCT VFR BLD AUTO: 35.5 % (ref 35–47)
HGB BLD-MCNC: 12.4 G/DL (ref 11.7–15.7)
IMM GRANULOCYTES # BLD: 0.1 10E3/UL
IMM GRANULOCYTES NFR BLD: 1 %
LYMPHOCYTES # BLD AUTO: 1.8 10E3/UL (ref 0.8–5.3)
LYMPHOCYTES NFR BLD AUTO: 22 %
MCH RBC QN AUTO: 32.3 PG (ref 26.5–33)
MCHC RBC AUTO-ENTMCNC: 34.9 G/DL (ref 31.5–36.5)
MCV RBC AUTO: 92 FL (ref 78–100)
MONOCYTES # BLD AUTO: 0.6 10E3/UL (ref 0–1.3)
MONOCYTES NFR BLD AUTO: 7 %
NEUTROPHILS # BLD AUTO: 5.6 10E3/UL (ref 1.6–8.3)
NEUTROPHILS NFR BLD AUTO: 69 %
NRBC # BLD AUTO: 0 10E3/UL
NRBC BLD AUTO-RTO: 0 /100
PLATELET # BLD AUTO: 157 10E3/UL (ref 150–450)
RBC # BLD AUTO: 3.84 10E6/UL (ref 3.8–5.2)
SARS-COV-2 RNA RESP QL NAA+PROBE: NEGATIVE
SPECIMEN EXPIRATION DATE: NORMAL
WBC # BLD AUTO: 8.2 10E3/UL (ref 4–11)

## 2023-08-08 PROCEDURE — C9803 HOPD COVID-19 SPEC COLLECT: HCPCS

## 2023-08-08 PROCEDURE — 85025 COMPLETE CBC W/AUTO DIFF WBC: CPT | Performed by: ADVANCED PRACTICE MIDWIFE

## 2023-08-08 PROCEDURE — 87635 SARS-COV-2 COVID-19 AMP PRB: CPT | Performed by: ADVANCED PRACTICE MIDWIFE

## 2023-08-08 PROCEDURE — 86901 BLOOD TYPING SEROLOGIC RH(D): CPT | Performed by: ADVANCED PRACTICE MIDWIFE

## 2023-08-08 PROCEDURE — 86780 TREPONEMA PALLIDUM: CPT | Performed by: ADVANCED PRACTICE MIDWIFE

## 2023-08-08 PROCEDURE — 120N000002 HC R&B MED SURG/OB UMMC

## 2023-08-08 RX ORDER — LIDOCAINE 40 MG/G
CREAM TOPICAL
Status: DISCONTINUED | OUTPATIENT
Start: 2023-08-08 | End: 2023-08-09

## 2023-08-08 RX ORDER — OXYTOCIN/0.9 % SODIUM CHLORIDE 30/500 ML
100-340 PLASTIC BAG, INJECTION (ML) INTRAVENOUS CONTINUOUS PRN
Status: DISCONTINUED | OUTPATIENT
Start: 2023-08-08 | End: 2023-08-09

## 2023-08-08 RX ORDER — TRANEXAMIC ACID 10 MG/ML
1 INJECTION, SOLUTION INTRAVENOUS EVERY 30 MIN PRN
Status: COMPLETED | OUTPATIENT
Start: 2023-08-08 | End: 2023-08-09

## 2023-08-08 RX ORDER — MISOPROSTOL 200 UG/1
800 TABLET ORAL
Status: DISCONTINUED | OUTPATIENT
Start: 2023-08-08 | End: 2023-08-09

## 2023-08-08 RX ORDER — METOCLOPRAMIDE HYDROCHLORIDE 5 MG/ML
10 INJECTION INTRAMUSCULAR; INTRAVENOUS EVERY 6 HOURS PRN
Status: DISCONTINUED | OUTPATIENT
Start: 2023-08-08 | End: 2023-08-09

## 2023-08-08 RX ORDER — OXYTOCIN 10 [USP'U]/ML
10 INJECTION, SOLUTION INTRAMUSCULAR; INTRAVENOUS
Status: DISCONTINUED | OUTPATIENT
Start: 2023-08-08 | End: 2023-08-09

## 2023-08-08 RX ORDER — CITRIC ACID/SODIUM CITRATE 334-500MG
30 SOLUTION, ORAL ORAL
Status: DISCONTINUED | OUTPATIENT
Start: 2023-08-08 | End: 2023-08-09

## 2023-08-08 RX ORDER — NALOXONE HYDROCHLORIDE 0.4 MG/ML
0.2 INJECTION, SOLUTION INTRAMUSCULAR; INTRAVENOUS; SUBCUTANEOUS
Status: DISCONTINUED | OUTPATIENT
Start: 2023-08-08 | End: 2023-08-09

## 2023-08-08 RX ORDER — OXYTOCIN/0.9 % SODIUM CHLORIDE 30/500 ML
340 PLASTIC BAG, INJECTION (ML) INTRAVENOUS CONTINUOUS PRN
Status: DISCONTINUED | OUTPATIENT
Start: 2023-08-08 | End: 2023-08-09

## 2023-08-08 RX ORDER — PROCHLORPERAZINE MALEATE 10 MG
10 TABLET ORAL EVERY 6 HOURS PRN
Status: DISCONTINUED | OUTPATIENT
Start: 2023-08-08 | End: 2023-08-09

## 2023-08-08 RX ORDER — NALOXONE HYDROCHLORIDE 0.4 MG/ML
0.4 INJECTION, SOLUTION INTRAMUSCULAR; INTRAVENOUS; SUBCUTANEOUS
Status: DISCONTINUED | OUTPATIENT
Start: 2023-08-08 | End: 2023-08-09

## 2023-08-08 RX ORDER — CARBOPROST TROMETHAMINE 250 UG/ML
250 INJECTION, SOLUTION INTRAMUSCULAR
Status: DISCONTINUED | OUTPATIENT
Start: 2023-08-08 | End: 2023-08-09

## 2023-08-08 RX ORDER — IBUPROFEN 800 MG/1
800 TABLET, FILM COATED ORAL
Status: DISCONTINUED | OUTPATIENT
Start: 2023-08-08 | End: 2023-08-09

## 2023-08-08 RX ORDER — PROCHLORPERAZINE 25 MG
25 SUPPOSITORY, RECTAL RECTAL EVERY 12 HOURS PRN
Status: DISCONTINUED | OUTPATIENT
Start: 2023-08-08 | End: 2023-08-09

## 2023-08-08 RX ORDER — KETOROLAC TROMETHAMINE 30 MG/ML
30 INJECTION, SOLUTION INTRAMUSCULAR; INTRAVENOUS
Status: DISCONTINUED | OUTPATIENT
Start: 2023-08-08 | End: 2023-08-09

## 2023-08-08 RX ORDER — ONDANSETRON 2 MG/ML
4 INJECTION INTRAMUSCULAR; INTRAVENOUS EVERY 6 HOURS PRN
Status: DISCONTINUED | OUTPATIENT
Start: 2023-08-08 | End: 2023-08-09

## 2023-08-08 RX ORDER — METOCLOPRAMIDE 10 MG/1
10 TABLET ORAL EVERY 6 HOURS PRN
Status: DISCONTINUED | OUTPATIENT
Start: 2023-08-08 | End: 2023-08-09

## 2023-08-08 RX ORDER — OXYTOCIN/0.9 % SODIUM CHLORIDE 30/500 ML
1-24 PLASTIC BAG, INJECTION (ML) INTRAVENOUS CONTINUOUS
Status: DISCONTINUED | OUTPATIENT
Start: 2023-08-09 | End: 2023-08-09

## 2023-08-08 RX ORDER — ONDANSETRON 4 MG/1
4 TABLET, ORALLY DISINTEGRATING ORAL EVERY 6 HOURS PRN
Status: DISCONTINUED | OUTPATIENT
Start: 2023-08-08 | End: 2023-08-09

## 2023-08-08 RX ORDER — FENTANYL CITRATE 50 UG/ML
100 INJECTION, SOLUTION INTRAMUSCULAR; INTRAVENOUS
Status: DISCONTINUED | OUTPATIENT
Start: 2023-08-08 | End: 2023-08-09

## 2023-08-08 RX ORDER — METHYLERGONOVINE MALEATE 0.2 MG/ML
200 INJECTION INTRAVENOUS
Status: DISCONTINUED | OUTPATIENT
Start: 2023-08-08 | End: 2023-08-09

## 2023-08-08 RX ORDER — MISOPROSTOL 200 UG/1
400 TABLET ORAL
Status: DISCONTINUED | OUTPATIENT
Start: 2023-08-08 | End: 2023-08-09

## 2023-08-08 RX ORDER — SODIUM CHLORIDE, SODIUM LACTATE, POTASSIUM CHLORIDE, CALCIUM CHLORIDE 600; 310; 30; 20 MG/100ML; MG/100ML; MG/100ML; MG/100ML
INJECTION, SOLUTION INTRAVENOUS CONTINUOUS PRN
Status: DISCONTINUED | OUTPATIENT
Start: 2023-08-08 | End: 2023-08-09

## 2023-08-08 ASSESSMENT — ACTIVITIES OF DAILY LIVING (ADL)
ADLS_ACUITY_SCORE: 18
ADLS_ACUITY_SCORE: 18

## 2023-08-09 ENCOUNTER — ANESTHESIA (OUTPATIENT)
Dept: OBGYN | Facility: CLINIC | Age: 33
End: 2023-08-09
Payer: COMMERCIAL

## 2023-08-09 ENCOUNTER — ANESTHESIA EVENT (OUTPATIENT)
Dept: OBGYN | Facility: CLINIC | Age: 33
End: 2023-08-09
Payer: COMMERCIAL

## 2023-08-09 LAB
APTT PPP: 26 SECONDS (ref 22–38)
APTT PPP: 27 SECONDS (ref 22–38)
ERYTHROCYTE [DISTWIDTH] IN BLOOD BY AUTOMATED COUNT: 13 % (ref 10–15)
ERYTHROCYTE [DISTWIDTH] IN BLOOD BY AUTOMATED COUNT: 13 % (ref 10–15)
FIBRINOGEN PPP-MCNC: 322 MG/DL (ref 170–490)
FIBRINOGEN PPP-MCNC: 397 MG/DL (ref 170–490)
HCT VFR BLD AUTO: 38.5 % (ref 35–47)
HCT VFR BLD AUTO: 40 % (ref 35–47)
HGB BLD-MCNC: 12.9 G/DL (ref 11.7–15.7)
HGB BLD-MCNC: 13.7 G/DL (ref 11.7–15.7)
INR PPP: 0.94 (ref 0.85–1.15)
INR PPP: 1.03 (ref 0.85–1.15)
MCH RBC QN AUTO: 31.9 PG (ref 26.5–33)
MCH RBC QN AUTO: 31.9 PG (ref 26.5–33)
MCHC RBC AUTO-ENTMCNC: 33.5 G/DL (ref 31.5–36.5)
MCHC RBC AUTO-ENTMCNC: 34.3 G/DL (ref 31.5–36.5)
MCV RBC AUTO: 93 FL (ref 78–100)
MCV RBC AUTO: 95 FL (ref 78–100)
PLATELET # BLD AUTO: 147 10E3/UL (ref 150–450)
PLATELET # BLD AUTO: 147 10E3/UL (ref 150–450)
RBC # BLD AUTO: 4.04 10E6/UL (ref 3.8–5.2)
RBC # BLD AUTO: 4.3 10E6/UL (ref 3.8–5.2)
T PALLIDUM AB SER QL: NONREACTIVE
WBC # BLD AUTO: 10.5 10E3/UL (ref 4–11)
WBC # BLD AUTO: 9.3 10E3/UL (ref 4–11)

## 2023-08-09 PROCEDURE — 85730 THROMBOPLASTIN TIME PARTIAL: CPT

## 2023-08-09 PROCEDURE — 250N000009 HC RX 250: Performed by: ADVANCED PRACTICE MIDWIFE

## 2023-08-09 PROCEDURE — 999N000141 HC STATISTIC PRE-PROCEDURE NURSING ASSESSMENT: Performed by: OBSTETRICS & GYNECOLOGY

## 2023-08-09 PROCEDURE — 999N000016 HC STATISTIC ATTENDANCE AT DELIVERY

## 2023-08-09 PROCEDURE — 272N000001 HC OR GENERAL SUPPLY STERILE: Performed by: OBSTETRICS & GYNECOLOGY

## 2023-08-09 PROCEDURE — 360N000076 HC SURGERY LEVEL 3, PER MIN: Performed by: OBSTETRICS & GYNECOLOGY

## 2023-08-09 PROCEDURE — 710N000011 HC RECOVERY PHASE 1, LEVEL 3, PER MIN: Performed by: OBSTETRICS & GYNECOLOGY

## 2023-08-09 PROCEDURE — 59514 CESAREAN DELIVERY ONLY: CPT | Mod: 80 | Performed by: OBSTETRICS & GYNECOLOGY

## 2023-08-09 PROCEDURE — 120N000002 HC R&B MED SURG/OB UMMC

## 2023-08-09 PROCEDURE — 85384 FIBRINOGEN ACTIVITY: CPT

## 2023-08-09 PROCEDURE — 3E0R3BZ INTRODUCTION OF ANESTHETIC AGENT INTO SPINAL CANAL, PERCUTANEOUS APPROACH: ICD-10-PCS | Performed by: ANESTHESIOLOGY

## 2023-08-09 PROCEDURE — 85027 COMPLETE CBC AUTOMATED: CPT

## 2023-08-09 PROCEDURE — 85027 COMPLETE CBC AUTOMATED: CPT | Performed by: REGISTERED NURSE

## 2023-08-09 PROCEDURE — 250N000013 HC RX MED GY IP 250 OP 250 PS 637: Performed by: STUDENT IN AN ORGANIZED HEALTH CARE EDUCATION/TRAINING PROGRAM

## 2023-08-09 PROCEDURE — 250N000011 HC RX IP 250 OP 636: Performed by: ADVANCED PRACTICE MIDWIFE

## 2023-08-09 PROCEDURE — 250N000011 HC RX IP 250 OP 636

## 2023-08-09 PROCEDURE — 258N000003 HC RX IP 258 OP 636

## 2023-08-09 PROCEDURE — 85610 PROTHROMBIN TIME: CPT

## 2023-08-09 PROCEDURE — 85384 FIBRINOGEN ACTIVITY: CPT | Performed by: REGISTERED NURSE

## 2023-08-09 PROCEDURE — 258N000003 HC RX IP 258 OP 636: Performed by: ADVANCED PRACTICE MIDWIFE

## 2023-08-09 PROCEDURE — 250N000009 HC RX 250

## 2023-08-09 PROCEDURE — 00HU33Z INSERTION OF INFUSION DEVICE INTO SPINAL CANAL, PERCUTANEOUS APPROACH: ICD-10-PCS | Performed by: ANESTHESIOLOGY

## 2023-08-09 PROCEDURE — 271N000001 HC OR GENERAL SUPPLY NON-STERILE: Performed by: OBSTETRICS & GYNECOLOGY

## 2023-08-09 PROCEDURE — 85730 THROMBOPLASTIN TIME PARTIAL: CPT | Performed by: REGISTERED NURSE

## 2023-08-09 PROCEDURE — 85610 PROTHROMBIN TIME: CPT | Performed by: REGISTERED NURSE

## 2023-08-09 PROCEDURE — 370N000017 HC ANESTHESIA TECHNICAL FEE, PER MIN: Performed by: OBSTETRICS & GYNECOLOGY

## 2023-08-09 PROCEDURE — 88307 TISSUE EXAM BY PATHOLOGIST: CPT | Mod: TC

## 2023-08-09 PROCEDURE — 36415 COLL VENOUS BLD VENIPUNCTURE: CPT | Performed by: REGISTERED NURSE

## 2023-08-09 PROCEDURE — 10907ZC DRAINAGE OF AMNIOTIC FLUID, THERAPEUTIC FROM PRODUCTS OF CONCEPTION, VIA NATURAL OR ARTIFICIAL OPENING: ICD-10-PCS | Performed by: REGISTERED NURSE

## 2023-08-09 RX ORDER — CEFAZOLIN SODIUM/WATER 2 G/20 ML
2 SYRINGE (ML) INTRAVENOUS
Status: DISCONTINUED | OUTPATIENT
Start: 2023-08-09 | End: 2023-08-09

## 2023-08-09 RX ORDER — TRANEXAMIC ACID 10 MG/ML
1 INJECTION, SOLUTION INTRAVENOUS EVERY 30 MIN PRN
Status: DISCONTINUED | OUTPATIENT
Start: 2023-08-09 | End: 2023-08-09

## 2023-08-09 RX ORDER — METOCLOPRAMIDE HYDROCHLORIDE 5 MG/ML
10 INJECTION INTRAMUSCULAR; INTRAVENOUS EVERY 6 HOURS PRN
Status: DISCONTINUED | OUTPATIENT
Start: 2023-08-09 | End: 2023-08-12 | Stop reason: HOSPADM

## 2023-08-09 RX ORDER — TRANEXAMIC ACID 10 MG/ML
1 INJECTION, SOLUTION INTRAVENOUS EVERY 30 MIN PRN
Status: DISCONTINUED | OUTPATIENT
Start: 2023-08-09 | End: 2023-08-12 | Stop reason: HOSPADM

## 2023-08-09 RX ORDER — MISOPROSTOL 200 UG/1
800 TABLET ORAL
Status: DISCONTINUED | OUTPATIENT
Start: 2023-08-09 | End: 2023-08-09

## 2023-08-09 RX ORDER — AZITHROMYCIN 500 MG/5ML
500 INJECTION, POWDER, LYOPHILIZED, FOR SOLUTION INTRAVENOUS
Status: DISCONTINUED | OUTPATIENT
Start: 2023-08-09 | End: 2023-08-09

## 2023-08-09 RX ORDER — SIMETHICONE 80 MG
80 TABLET,CHEWABLE ORAL 4 TIMES DAILY PRN
Status: DISCONTINUED | OUTPATIENT
Start: 2023-08-09 | End: 2023-08-12 | Stop reason: HOSPADM

## 2023-08-09 RX ORDER — OXYTOCIN 10 [USP'U]/ML
10 INJECTION, SOLUTION INTRAMUSCULAR; INTRAVENOUS
Status: DISCONTINUED | OUTPATIENT
Start: 2023-08-09 | End: 2023-08-09

## 2023-08-09 RX ORDER — CEFAZOLIN SODIUM/WATER 2 G/20 ML
2 SYRINGE (ML) INTRAVENOUS SEE ADMIN INSTRUCTIONS
Status: DISCONTINUED | OUTPATIENT
Start: 2023-08-09 | End: 2023-08-09

## 2023-08-09 RX ORDER — OXYTOCIN/0.9 % SODIUM CHLORIDE 30/500 ML
340 PLASTIC BAG, INJECTION (ML) INTRAVENOUS CONTINUOUS PRN
Status: DISCONTINUED | OUTPATIENT
Start: 2023-08-09 | End: 2023-08-12 | Stop reason: HOSPADM

## 2023-08-09 RX ORDER — CEFAZOLIN SODIUM 1 G/3ML
1 INJECTION, POWDER, FOR SOLUTION INTRAMUSCULAR; INTRAVENOUS ONCE
Status: COMPLETED | OUTPATIENT
Start: 2023-08-09 | End: 2023-08-09

## 2023-08-09 RX ORDER — AMOXICILLIN 250 MG
1 CAPSULE ORAL 2 TIMES DAILY
Status: DISCONTINUED | OUTPATIENT
Start: 2023-08-09 | End: 2023-08-12 | Stop reason: HOSPADM

## 2023-08-09 RX ORDER — OXYCODONE HYDROCHLORIDE 5 MG/1
5 TABLET ORAL EVERY 4 HOURS PRN
Status: DISCONTINUED | OUTPATIENT
Start: 2023-08-09 | End: 2023-08-12 | Stop reason: HOSPADM

## 2023-08-09 RX ORDER — METOCLOPRAMIDE 10 MG/1
10 TABLET ORAL EVERY 6 HOURS PRN
Status: DISCONTINUED | OUTPATIENT
Start: 2023-08-09 | End: 2023-08-12 | Stop reason: HOSPADM

## 2023-08-09 RX ORDER — NALOXONE HYDROCHLORIDE 0.4 MG/ML
0.2 INJECTION, SOLUTION INTRAMUSCULAR; INTRAVENOUS; SUBCUTANEOUS
Status: DISCONTINUED | OUTPATIENT
Start: 2023-08-09 | End: 2023-08-12 | Stop reason: HOSPADM

## 2023-08-09 RX ORDER — LIDOCAINE 40 MG/G
CREAM TOPICAL
Status: DISCONTINUED | OUTPATIENT
Start: 2023-08-09 | End: 2023-08-12 | Stop reason: HOSPADM

## 2023-08-09 RX ORDER — NALOXONE HYDROCHLORIDE 0.4 MG/ML
0.4 INJECTION, SOLUTION INTRAMUSCULAR; INTRAVENOUS; SUBCUTANEOUS
Status: DISCONTINUED | OUTPATIENT
Start: 2023-08-09 | End: 2023-08-12 | Stop reason: HOSPADM

## 2023-08-09 RX ORDER — SODIUM CHLORIDE, SODIUM LACTATE, POTASSIUM CHLORIDE, CALCIUM CHLORIDE 600; 310; 30; 20 MG/100ML; MG/100ML; MG/100ML; MG/100ML
INJECTION, SOLUTION INTRAVENOUS CONTINUOUS
Status: DISCONTINUED | OUTPATIENT
Start: 2023-08-09 | End: 2023-08-09

## 2023-08-09 RX ORDER — CARBOPROST TROMETHAMINE 250 UG/ML
250 INJECTION, SOLUTION INTRAMUSCULAR
Status: DISCONTINUED | OUTPATIENT
Start: 2023-08-09 | End: 2023-08-12 | Stop reason: HOSPADM

## 2023-08-09 RX ORDER — HYDROCORTISONE 25 MG/G
CREAM TOPICAL 3 TIMES DAILY PRN
Status: DISCONTINUED | OUTPATIENT
Start: 2023-08-09 | End: 2023-08-12 | Stop reason: HOSPADM

## 2023-08-09 RX ORDER — OXYTOCIN 10 [USP'U]/ML
10 INJECTION, SOLUTION INTRAMUSCULAR; INTRAVENOUS
Status: DISCONTINUED | OUTPATIENT
Start: 2023-08-09 | End: 2023-08-12 | Stop reason: HOSPADM

## 2023-08-09 RX ORDER — MODIFIED LANOLIN
OINTMENT (GRAM) TOPICAL
Status: DISCONTINUED | OUTPATIENT
Start: 2023-08-09 | End: 2023-08-12 | Stop reason: HOSPADM

## 2023-08-09 RX ORDER — OXYTOCIN/0.9 % SODIUM CHLORIDE 30/500 ML
100-340 PLASTIC BAG, INJECTION (ML) INTRAVENOUS CONTINUOUS PRN
Status: DISCONTINUED | OUTPATIENT
Start: 2023-08-09 | End: 2023-08-09

## 2023-08-09 RX ORDER — PROPOFOL 10 MG/ML
INJECTION, EMULSION INTRAVENOUS PRN
Status: DISCONTINUED | OUTPATIENT
Start: 2023-08-09 | End: 2023-08-09

## 2023-08-09 RX ORDER — ONDANSETRON 2 MG/ML
4 INJECTION INTRAMUSCULAR; INTRAVENOUS EVERY 6 HOURS PRN
Status: DISCONTINUED | OUTPATIENT
Start: 2023-08-09 | End: 2023-08-12 | Stop reason: HOSPADM

## 2023-08-09 RX ORDER — LIDOCAINE 40 MG/G
CREAM TOPICAL
Status: DISCONTINUED | OUTPATIENT
Start: 2023-08-09 | End: 2023-08-09

## 2023-08-09 RX ORDER — FENTANYL CITRATE 50 UG/ML
INJECTION, SOLUTION INTRAMUSCULAR; INTRAVENOUS PRN
Status: DISCONTINUED | OUTPATIENT
Start: 2023-08-09 | End: 2023-08-09

## 2023-08-09 RX ORDER — MISOPROSTOL 200 UG/1
400 TABLET ORAL
Status: DISCONTINUED | OUTPATIENT
Start: 2023-08-09 | End: 2023-08-09

## 2023-08-09 RX ORDER — METHYLERGONOVINE MALEATE 0.2 MG/ML
200 INJECTION INTRAVENOUS
Status: DISCONTINUED | OUTPATIENT
Start: 2023-08-09 | End: 2023-08-09

## 2023-08-09 RX ORDER — PROPOFOL 10 MG/ML
INJECTION, EMULSION INTRAVENOUS CONTINUOUS PRN
Status: DISCONTINUED | OUTPATIENT
Start: 2023-08-09 | End: 2023-08-09

## 2023-08-09 RX ORDER — METHYLERGONOVINE MALEATE 0.2 MG/ML
200 INJECTION INTRAVENOUS
Status: DISCONTINUED | OUTPATIENT
Start: 2023-08-09 | End: 2023-08-12 | Stop reason: HOSPADM

## 2023-08-09 RX ORDER — LIDOCAINE HYDROCHLORIDE 20 MG/ML
INJECTION, SOLUTION INFILTRATION; PERINEURAL PRN
Status: DISCONTINUED | OUTPATIENT
Start: 2023-08-09 | End: 2023-08-09

## 2023-08-09 RX ORDER — PROCHLORPERAZINE MALEATE 10 MG
10 TABLET ORAL EVERY 6 HOURS PRN
Status: DISCONTINUED | OUTPATIENT
Start: 2023-08-09 | End: 2023-08-12 | Stop reason: HOSPADM

## 2023-08-09 RX ORDER — ACETAMINOPHEN 325 MG/1
975 TABLET ORAL EVERY 6 HOURS
Status: DISCONTINUED | OUTPATIENT
Start: 2023-08-09 | End: 2023-08-12 | Stop reason: HOSPADM

## 2023-08-09 RX ORDER — MISOPROSTOL 200 UG/1
400 TABLET ORAL
Status: DISCONTINUED | OUTPATIENT
Start: 2023-08-09 | End: 2023-08-12 | Stop reason: HOSPADM

## 2023-08-09 RX ORDER — CITRIC ACID/SODIUM CITRATE 334-500MG
30 SOLUTION, ORAL ORAL
Status: DISCONTINUED | OUTPATIENT
Start: 2023-08-09 | End: 2023-08-09

## 2023-08-09 RX ORDER — SODIUM CHLORIDE, SODIUM LACTATE, POTASSIUM CHLORIDE, CALCIUM CHLORIDE 600; 310; 30; 20 MG/100ML; MG/100ML; MG/100ML; MG/100ML
INJECTION, SOLUTION INTRAVENOUS CONTINUOUS PRN
Status: DISCONTINUED | OUTPATIENT
Start: 2023-08-09 | End: 2023-08-09

## 2023-08-09 RX ORDER — ONDANSETRON 4 MG/1
4 TABLET, ORALLY DISINTEGRATING ORAL EVERY 6 HOURS PRN
Status: DISCONTINUED | OUTPATIENT
Start: 2023-08-09 | End: 2023-08-12 | Stop reason: HOSPADM

## 2023-08-09 RX ORDER — ACETAMINOPHEN 325 MG/1
975 TABLET ORAL ONCE
Status: DISCONTINUED | OUTPATIENT
Start: 2023-08-09 | End: 2023-08-09

## 2023-08-09 RX ORDER — CEFAZOLIN SODIUM 1 G/3ML
INJECTION, POWDER, FOR SOLUTION INTRAMUSCULAR; INTRAVENOUS PRN
Status: DISCONTINUED | OUTPATIENT
Start: 2023-08-09 | End: 2023-08-09

## 2023-08-09 RX ORDER — AMOXICILLIN 250 MG
2 CAPSULE ORAL 2 TIMES DAILY
Status: DISCONTINUED | OUTPATIENT
Start: 2023-08-09 | End: 2023-08-12 | Stop reason: HOSPADM

## 2023-08-09 RX ORDER — OXYTOCIN/0.9 % SODIUM CHLORIDE 30/500 ML
340 PLASTIC BAG, INJECTION (ML) INTRAVENOUS CONTINUOUS PRN
Status: COMPLETED | OUTPATIENT
Start: 2023-08-09 | End: 2023-08-09

## 2023-08-09 RX ORDER — CARBOPROST TROMETHAMINE 250 UG/ML
250 INJECTION, SOLUTION INTRAMUSCULAR
Status: DISCONTINUED | OUTPATIENT
Start: 2023-08-09 | End: 2023-08-09

## 2023-08-09 RX ORDER — MISOPROSTOL 200 UG/1
800 TABLET ORAL
Status: DISCONTINUED | OUTPATIENT
Start: 2023-08-09 | End: 2023-08-12 | Stop reason: HOSPADM

## 2023-08-09 RX ORDER — BISACODYL 10 MG
10 SUPPOSITORY, RECTAL RECTAL DAILY PRN
Status: DISCONTINUED | OUTPATIENT
Start: 2023-08-11 | End: 2023-08-12 | Stop reason: HOSPADM

## 2023-08-09 RX ORDER — PROCHLORPERAZINE 25 MG
25 SUPPOSITORY, RECTAL RECTAL EVERY 12 HOURS PRN
Status: DISCONTINUED | OUTPATIENT
Start: 2023-08-09 | End: 2023-08-12 | Stop reason: HOSPADM

## 2023-08-09 RX ORDER — DEXTROSE, SODIUM CHLORIDE, SODIUM LACTATE, POTASSIUM CHLORIDE, AND CALCIUM CHLORIDE 5; .6; .31; .03; .02 G/100ML; G/100ML; G/100ML; G/100ML; G/100ML
INJECTION, SOLUTION INTRAVENOUS CONTINUOUS
Status: DISCONTINUED | OUTPATIENT
Start: 2023-08-09 | End: 2023-08-12 | Stop reason: HOSPADM

## 2023-08-09 RX ADMIN — SODIUM CHLORIDE, POTASSIUM CHLORIDE, SODIUM LACTATE AND CALCIUM CHLORIDE: 600; 310; 30; 20 INJECTION, SOLUTION INTRAVENOUS at 00:14

## 2023-08-09 RX ADMIN — CEFAZOLIN 1 G: 1 INJECTION, POWDER, FOR SOLUTION INTRAMUSCULAR; INTRAVENOUS at 22:53

## 2023-08-09 RX ADMIN — METHYLERGONOVINE MALEATE 200 MCG: 0.2 INJECTION INTRAVENOUS at 17:39

## 2023-08-09 RX ADMIN — FENTANYL CITRATE 50 MCG: 50 INJECTION, SOLUTION INTRAMUSCULAR; INTRAVENOUS at 17:39

## 2023-08-09 RX ADMIN — MIDAZOLAM 2 MG: 1 INJECTION INTRAMUSCULAR; INTRAVENOUS at 17:33

## 2023-08-09 RX ADMIN — SODIUM CHLORIDE, POTASSIUM CHLORIDE, SODIUM LACTATE AND CALCIUM CHLORIDE: 600; 310; 30; 20 INJECTION, SOLUTION INTRAVENOUS at 13:00

## 2023-08-09 RX ADMIN — TRANEXAMIC ACID 1 G: 10 INJECTION, SOLUTION INTRAVENOUS at 17:37

## 2023-08-09 RX ADMIN — LIDOCAINE HYDROCHLORIDE 100 MG: 20 INJECTION, SOLUTION INFILTRATION; PERINEURAL at 17:32

## 2023-08-09 RX ADMIN — FENTANYL CITRATE 100 MCG: 50 INJECTION, SOLUTION INTRAMUSCULAR; INTRAVENOUS at 15:09

## 2023-08-09 RX ADMIN — AZITHROMYCIN MONOHYDRATE 500 MG: 500 INJECTION, POWDER, LYOPHILIZED, FOR SOLUTION INTRAVENOUS at 17:34

## 2023-08-09 RX ADMIN — PROPOFOL 200 MG: 10 INJECTION, EMULSION INTRAVENOUS at 17:32

## 2023-08-09 RX ADMIN — Medication 2 MILLI-UNITS/MIN: at 00:15

## 2023-08-09 RX ADMIN — PROPOFOL 150 MCG/KG/MIN: 10 INJECTION, EMULSION INTRAVENOUS at 17:33

## 2023-08-09 RX ADMIN — SODIUM CHLORIDE, POTASSIUM CHLORIDE, SODIUM LACTATE AND CALCIUM CHLORIDE: 600; 310; 30; 20 INJECTION, SOLUTION INTRAVENOUS at 17:25

## 2023-08-09 RX ADMIN — ACETAMINOPHEN 975 MG: 325 TABLET, FILM COATED ORAL at 22:20

## 2023-08-09 RX ADMIN — FENTANYL CITRATE 50 MCG: 50 INJECTION, SOLUTION INTRAMUSCULAR; INTRAVENOUS at 17:33

## 2023-08-09 RX ADMIN — Medication 300 ML/HR: at 17:34

## 2023-08-09 RX ADMIN — CEFAZOLIN 2 G: 1 INJECTION, POWDER, FOR SOLUTION INTRAMUSCULAR; INTRAVENOUS at 17:33

## 2023-08-09 RX ADMIN — Medication 40 MG: at 17:32

## 2023-08-09 RX ADMIN — FENTANYL CITRATE 50 MCG: 50 INJECTION, SOLUTION INTRAMUSCULAR; INTRAVENOUS at 19:00

## 2023-08-09 RX ADMIN — FENTANYL CITRATE 50 MCG: 50 INJECTION, SOLUTION INTRAMUSCULAR; INTRAVENOUS at 18:45

## 2023-08-09 RX ADMIN — TRANEXAMIC ACID 1 G: 10 INJECTION, SOLUTION INTRAVENOUS at 18:55

## 2023-08-09 RX ADMIN — SODIUM CHLORIDE, POTASSIUM CHLORIDE, SODIUM LACTATE AND CALCIUM CHLORIDE: 600; 310; 30; 20 INJECTION, SOLUTION INTRAVENOUS at 04:37

## 2023-08-09 ASSESSMENT — ACTIVITIES OF DAILY LIVING (ADL)
ADLS_ACUITY_SCORE: 18

## 2023-08-09 NOTE — PROGRESS NOTES
Labor Progress Note    Subjective: Sleeping, comfortable with epidural.     Objective:  BP 95/50 (BP Location: Right arm, Patient Position: Semi-Mccormack's, Cuff Size: Adult Regular)   Pulse 66   Temp 97.4  F (36.3  C) (Oral)   Resp 16   LMP 2022 (Approximate)    General appearance: comfortable.  Contractions: Every 2-4 minutes. 50-80 seconds duration.    FHR: continuous EFM- baseline 130 with moderate variability and positive accelerations. No decelerations.  Assessment: EFM interpretation suggests absence of concern for fetal metabolic acidemia at this time due to variability: moderate    ROM: not ruptured  PELVIC EXAM: deferred  Pitocin - 12 mu/min.,  Antibiotics - none    Assessment:  IUP @ 40w0d for induction of labor.  Indication: Hx of term fetal demise   Fetal Heart Rate Tracing category one  GBS - negative  TOLAC, h/o  x1  Patient Active Problem List   Diagnosis    Dental caries    High risk pregnancy, antepartum    History of postpartum hemorrhage    History of stillbirth    MDD (major depressive disorder), recurrent episode, moderate (H)    Nausea and vomiting during pregnancy    Vitamin D deficiency    History of birth trauma, stillborn [facility], see note from 7/3/23    History of  delivery    Inadequate social support    Need for immunization against viral hepatitis    Labor and delivery, indication for care       Plan:  - Continue to rest. Anticipate cervical exam once patient awake.   - Ambulation, hydration, position changes, birthing ball/sling and tub options to facilitate labor.  - Continue labor induction with Pitocin  - Reevaluate in 2-4 hours/sooner prn     GEOVANY Moon CNM

## 2023-08-09 NOTE — PROGRESS NOTES
Blood pressure 101/56, pulse 66, temperature 97.4  F (36.3  C), temperature source Oral, resp. rate 16, last menstrual period 2022.    General appearance: comfortable, feeling mild contractions; Support: none  CONTRACTIONS: Contractions every 2-4 minutes.  Palpate: moderate  FETAL HEART TONES: Baseline rate 140, normal, Variability minimal, Accelerations not present , Decelerations- isolated late and variable decelerations at 0236, resolved with repositioning    The interventions currently taken to improve the fetal heart rate tracing and fetal oxygenation are: maternal positioning and IV fluid bolus     Pitocin- 8 mu/min.,  Antibiotics- none  ROM: not ruptured  PELVIC EXAM:deferred    ASSESSMENT:  ==============  Yusra HOUSTON Christian  33 year old  female  Estimated Date of Delivery: Aug 9, 2023  IUP @ 40w0d IOL hx of term demise   Fetal Heart rate tracing category two  GBS- negative    Patient Active Problem List   Diagnosis    Dental caries    High risk pregnancy, antepartum    History of postpartum hemorrhage    History of stillbirth    MDD (major depressive disorder), recurrent episode, moderate (H)    Nausea and vomiting during pregnancy    Vitamin D deficiency    History of birth trauma, stillborn baby [facility], see note from 7/3/23    History of  delivery    Inadequate social support    Need for immunization against viral hepatitis    Labor and delivery, indication for care     PLAN:  ===========   Ambulation, hydration, position changes, birthing ball/sling and tub options to facilitate labor.  Continue labor induction with Pitocin   Reevaluate in 2-4 hours prn    Per the category II algorithm, the plan is to continue observe/conservative management. Reevaluate in 30-60 minutes.     GEOVANY NevesM,CNM, APRN

## 2023-08-09 NOTE — PLAN OF CARE
Pt called out reporting increased pressure like she needed to have a bowel movement at 1600, involuntary pushing observed. BECCA Guzman called to bedside for assessment. SVE complete per provider at 1605. Pushing efforts started, direction given to assist with effective pushing. Pushed effectively for 30-40minutes with direction and provider presence at the bedside. Patient stated that she was tired and felt like she couldn't do it. Patient's  stated that this is the same thing that happened with their first delivery and he would like to proceed with , patient agreed. Dr. Savage called to bedside for assessment at 1650, discussed vacuum extraction with patient; however, patient declined and her  became more upset stating that the team was not listening to their wishes and that he did not want to lose another baby. Consents for  and blood transfusion obtained by Dr. Savage using phone . Dr. Clark came to bedside to consult patient at 1715. Patient's  continued to express his concern, reassured both the patient and her  that the fetal monitoring strip was reassuring at this time and patient was pushing effectively. Anesthesia at bedside. Patient's  expressed desire to proceed with  right away, patient agreed. Stat  called at 1723.

## 2023-08-09 NOTE — PLAN OF CARE
Goal Outcome Evaluation:    Plan of Care Reviewed With: patient    Overall Patient Progress: improving    VSS overnight. Denies any leaking of fluids, vaginal bleeding, headache, SOB, chest pain, RUQ pain, N/V, or changes in vision. Last SVE at 2030- 2/50/-2. Sher balloon placed at 2155. Sher balloon fell out with check at 2335. Pitocin started at 0015. See MAR for details. Pt and baby tolerating well. Category 1 tracing for majority of night. See [facility] flowsheets for details. FOB, Asmerom, present in the evening. Plans to return this AM. Pt feeling increased pain and pressure with contractions, but still able to sleep through most of them. Pain management options discussed. Pt would like to try and hold off on interventions for as long as possible. No further questions or concerns to note.

## 2023-08-09 NOTE — PLAN OF CARE
Data: Patient admitted to room 0475 at 1940. Patient is a . Prenatal record reviewed.   OB History    Para Term  AB Living   2 1 1 0 0 0   SAB IAB Ectopic Multiple Live Births   0 0 0 0 0      # Outcome Date GA Lbr Chris/2nd Weight Sex Delivery Anes PTL Lv   2 Current            1 Term 19 40w0d   F CS-Unspec  N FD      Birth Comments: 23 Pt went into spontaneous labor, went to a birth center in [facility], was advised to start pushing at 9cm, had an episiotomy, doctor was called and baby  in utero. She was then advised to have a  section.      Obstetric Comments   23 Pt went into spontaneous labor, went to a birth center in [facility], was advised to start pushing at 9cm, had an episiotomy, doctor was called and baby  in utero. She was then advised to have a  section.    No GHTN, GDM,    Did have an episiotomy and postpartum hemorrhage   .  Medical History: History reviewed. No pertinent past medical history..  Gestational age 39w6d. Vital signs per doc flowsheet. Fetal movement present. Patient reports Induction Of Labor   as reason for admission. Support persons, MARY Rhoades present.  Action: Care of patient assumed at 1940. Verbal consent for EFM, external fetal monitors applied. Admission assessment completed. Patient and support persons educated on labor process. Patient instructed to report change in fetal movement, contractions, vaginal leaking of fluid or bleeding, abdominal pain, or any concerns related to the pregnancy to her nurse/physician. Patient oriented to room, call light in reach.   Response: Daniel Herrera CNM informed of pt arrival. Plan per provider is SVE and pit versus manzanares balloon placement. Patient verbalized understanding of education and verbalized agreement with plan. Patient coping with labor.

## 2023-08-09 NOTE — H&P
ADMIT NOTE  =================  39w6d    Yusra Gonzales is a 33 year old female with an Patient's last menstrual period was 2022 (approximate). and Estimated Date of Delivery: Aug 9, 2023 is [facility]23 at 9:00 PM with for induction of labor.  Indication: previous term demise.     HPI  ================  Pt presents for IOL d/t hx of term demise. Pt reports occasional contractions. Denies leaking of fluid or vaginal bleeding.  Reports good fetal movement.     Hx of traumatic birth/ in [place].     Contractions- mild and irregular  Fetal movement- active  ROM- no   Vaginal bleeding- none  GBS- negative  FOB- is not involved, but is present and supportive. FOB Asmerom  Other labor support- none    Weight gain- 156 - 128 lbs, Total weight gain- 28 lbs  Height- 68 in  BMI- 19  First prenatal visit at 22 weeks, Total visits- 13  KIERRA  at 34 weeks     PROBLEM LIST  =================  Patient Active Problem List    Diagnosis Date Noted    Labor and delivery, indication for care 2023     Priority: Medium    Need for immunization against viral hepatitis 2023     Priority: Medium     Hep B Non-immune.  23: Patient states that she completed series.  Declines dose today.      History of birth trauma, stillborn baby [facility], see note from 7/3/23 2023     Priority: Medium     23 Pt went into spontaneous labor, went to a birth center in [facility], was advised to start pushing at 9cm, had an episiotomy, doctor was called and baby  in utero. She was then advised to have a  section. She did have bleeding postpartum, unclear if it was related to her episiotomy    DENIES previous complications during pregnancy/birth such as SGA, IUGR,  labor with cervical dilation, gestational diabetes, shoulder dystocia, vacuum/forceps delivery, postpartum hemorrhage, gestational HTN, or preeclampsia.      History of  delivery 2023     Priority: Medium      8/9/2019 PLTCS of stillborn   Desires TOLAC      Inadequate social support 07/03/2023     Priority: Medium     7/3/23 referred to social work      Dental caries 05/08/2023     Priority: Medium    History of postpartum hemorrhage 02/13/2023     Priority: Medium    History of stillbirth 02/13/2023     Priority: Medium    Nausea and vomiting during pregnancy 02/13/2023     Priority: Medium    Vitamin D deficiency 02/13/2023     Priority: Medium    High risk pregnancy, antepartum 01/10/2023     Priority: Medium     [facility] Provider Group choice: CNM group   KIERRA at 34/5 weeks from the [facility]  Partner's name: [place], not involved, lives in another state, may come for birth  [ ]NOB folder  [x]Dating  [ ] did not have 1st tri screen  [ ]  [x]Fetal anatomy US ordered  [x]Rubella immune  [x]Hep B NON immune, offer vaccine _____  [x]Pap 4/21/23 NILM HPV neg, pap   [ ] KIERRA at 34 weeks ASA not initiated previously   [ ] No increased risk for GDM  [ ]No need for utox in labor  [ ]COVID vaccine completed  _____________________________________  [ ]EOB folder  [x]PP Contraception plan: Prefers to not use any form of contraception due to her [employer]ism  [x]Labor plans: desires TOLAC  [ ]:   [x]Infant feeding plan breast  [ ]FLU shot  [x]TDAP given 5/22/23  [ ]Rhogam if needed, NA O pos bloodtype  [x]TOLAC consent done 7/3/23 sent for scanning  [ ] Water birth interest - NA  [x]GCT  1 hour elevated, normal 3 hour,  ________________________________________  [ ] OTC PP meds sent  [x]PP plans, does not have any support postpartum, applied for volunteer a postpartum   [ ]Planning CS-ERAS pkt        MDD (major depressive disorder), recurrent episode, moderate (H) 01/05/2023     Priority: Medium       HISTORIES  ============  No Known Allergies  History reviewed. No pertinent past medical history.  Past Surgical History:   Procedure Laterality Date    LASIK BILATERAL  2018   .  Family History    Problem Relation Age of Onset    Breast Cancer Other     Colon Cancer Other     Diabetes Other     Hypertension Other      Social History     Tobacco Use    Smoking status: Never    Smokeless tobacco: Never   Substance Use Topics    Alcohol use: Never     OB History    Para Term  AB Living   2 1 1 0 0 0   SAB IAB Ectopic Multiple Live Births   0 0 0 0 0      # Outcome Date GA Lbr Chris/2nd Weight Sex Delivery Anes PTL Lv   2 Current            1 Term 19 40w0d   F CS-Unspec  N FD      Birth Comments: 23 Pt went into spontaneous labor, went to a birth center in [facility], was advised to start pushing at 9cm, had an episiotomy, doctor was called and baby  in utero. She was then advised to have a  section.      Obstetric Comments   23 Pt went into spontaneous labor, went to a birth center in [facility], was advised to start pushing at 9cm, had an episiotomy, doctor was called and baby  in utero. She was then advised to have a  section.    No GHTN, GDM,    Did have an episiotomy and postpartum hemorrhage        LABS:   ===========  Prenatal Labs:  Rhogam not indicated   Lab Results   Component Value Date    HGB 13.0 2023     Rubella immune  GBS neg    Other labs:  No results found for this or any previous visit (from the past 24 hour(s)).    ROS  =========  Pt denies significant respiratory, cardiovacular, GI, or muscular/skeletalcomplaints.    See RN data base ROS.     PHYSICAL EXAM:  ===============  /67 (BP Location: Right arm, Patient Position: Semi-Mccormack's, Cuff Size: Adult Regular)   Pulse 76   Temp 97.8  F (36.6  C) (Oral)   Resp 18   LMP 2022 (Approximate)   General appearance: comfortable  GENERAL APPEARANCE: healthy, alert and no distress  RESP: lungs clear to auscultation - no rales, rhonchi or wheezes  CV: regular rates and rhythm, normal S1 S2, no S3 or S4 and no murmur,and no varicosities  ABDOMEN:  soft, nontender, no epigastric pain  SKIN:  no suspicious lesions or rashes  NEURO: Denies headache, blurred vision, other vision changes  PSYCH: mentation appears normal. and affect normal/bright  Legs: Reflexes normal bilaterally     Abdomen: gravid, vertex fetus per Leopold's, non-tender between contractions.   Cephalic presentation confirmed by BSUS  EFW-  7.5-8 lbs.   CONTRACTIONS: irreg and mild  FETAL HEART TONES: continuous EFM- baseline 140 with moderate variability and positive accelerations. No decelerations.  PELVIC EXAM: 2/ 50%/ Mid/ soft/ -2   STOLL SCORE: 6  BLOODY SHOW: no   ROM:no  FLUID: none  AMNISURE: not done    ASSESSMENT:  ==============  IUP @ 39w6d admitted for induction of labor.  Indication: hx of term demise   NST NOT DONE  Fetal Heart Rate - category one  GBS- negative    Patient Active Problem List   Diagnosis    Dental caries    High risk pregnancy, antepartum    History of postpartum hemorrhage    History of stillbirth    MDD (major depressive disorder), recurrent episode, moderate (H)    Nausea and vomiting during pregnancy    Vitamin D deficiency    History of birth trauma, stillborn baby [facility], see note from 7/3/23    History of  delivery    Inadequate social support    Need for immunization against viral hepatitis    Labor and delivery, indication for care     PLAN:  ===========  Admit - see IP orders  Pain medication options of nitrous oxide, fentanyl IV and epidural anesthesia reviewed with pt. Pt will ask for pain medications if desired.  Discussed options for cervical ripening with manzanares bulb vs. induction with Pitocin. Pt desires manzanares bulb placement.  MD consultant on call Dr. Stovall aware of patient  Ambulation, hydration, position changes, birthing ball and tub options to facilitate labor reviewed with pt .  Re-evaluate in 4-6 hours GEOVANY Hernandez CNM    Addendum 2144:  Manzanares bulb placed without issue, balloon inflated to 70cc.     GEOVANY Neves CNM

## 2023-08-09 NOTE — ANESTHESIA PREPROCEDURE EVALUATION
Anesthesia Pre-Procedure Evaluation    Patient: Yusra Gonzales   MRN: [id number] : 1990        Procedure : Procedure(s):   section          History reviewed. No pertinent past medical history.   Past Surgical History:   Procedure Laterality Date    LASIK BILATERAL  2018      No Known Allergies   Social History     Tobacco Use    Smoking status: Never    Smokeless tobacco: Never   Substance Use Topics    Alcohol use: Never      Wt Readings from Last 1 Encounters:   23 70.8 kg (156 lb)        Anesthesia Evaluation   Pt has had prior anesthetic.     No history of anesthetic complications       ROS/MED HX  ENT/Pulmonary:  - neg pulmonary ROS     Neurologic:  - neg neurologic ROS     Cardiovascular:  - neg cardiovascular ROS     METS/Exercise Tolerance: >4 METS    Hematologic:  - neg hematologic  ROS     Musculoskeletal:  - neg musculoskeletal ROS     GI/Hepatic:  - neg GI/hepatic ROS     Renal/Genitourinary:  - neg Renal ROS     Endo:  - neg endo ROS     Psychiatric/Substance Use:  - neg psychiatric ROS     Infectious Disease:  - neg infectious disease ROS     Malignancy:  - neg malignancy ROS     Other:      (+) Possibly pregnant, , ,         Physical Exam    Airway   unable to assess          Respiratory Devices and Support         Dental    unable to assess        Cardiovascular    unable to assess         Pulmonary    Unable to assess               OUTSIDE LABS:  CBC:   Lab Results   Component Value Date    WBC 9.3 2023    WBC 8.2 2023    HGB 13.7 2023    HGB 12.4 2023    HCT 40.0 2023    HCT 35.5 2023     (L) 2023     2023     BMP:   Lab Results   Component Value Date     (L) 2023     2022    POTASSIUM 3.5 2023    POTASSIUM 3.5 2022    CHLORIDE 103 2023    CHLORIDE 108 2022    CO2 21 (L) 2023    CO2 23 2022    BUN 5.1 (L) 2023    BUN 5 (L) 2022    CR 0.38 (L)  04/25/2023    CR 0.53 12/16/2022     (H) 04/25/2023    GLC 92 12/16/2022     COAGS:   Lab Results   Component Value Date    PTT 27 08/09/2023    INR 0.94 08/09/2023    FIBR 397 08/09/2023     POC: No results found for: BGM, HCG, HCGS  HEPATIC:   Lab Results   Component Value Date    ALBUMIN 3.4 (L) 04/25/2023    PROTTOTAL 6.3 (L) 04/25/2023    ALT 14 04/25/2023    AST 19 04/25/2023    ALKPHOS 68 04/25/2023    BILITOTAL <0.2 04/25/2023     OTHER:   Lab Results   Component Value Date    CHET 8.7 04/25/2023    MAG 2.2 12/16/2022    LIPASE 38 04/25/2023    AMYLASE 111 (H) 04/25/2023       Anesthesia Plan    ASA Status:  2, emergent       Anesthesia Type: General.              Consents    Anesthesia Plan(s) and associated risks, benefits, and realistic alternatives discussed. Questions answered and patient/representative(s) expressed understanding.     - Discussed:     - Discussed with:  Patient, Spouse            Postoperative Care    Pain management: Peripheral nerve block (Continuous), Multi-modal analgesia.   PONV prophylaxis: Ondansetron (or other 5HT-3)     Comments:                Jessica Alexander MD

## 2023-08-09 NOTE — PROVIDER NOTIFICATION
08/09/23 1205   Provider Notification   Provider Name/Title BECCA Guzman   Method of Notification At Bedside     Pt called out to RN reporting bleeding in underwear. Noted soaked pad with bright red blood and multiple clots. CNM called to bedside, SVE performed, unchanged. EFM strip reviewed with provider. Plan to consult with OB team. No new orders at this time.

## 2023-08-09 NOTE — PROVIDER NOTIFICATION
08/09/23 0310   Provider Notification   Provider Name/Title LAMAR Herrera CNM   Method of Notification In Department   Notification Reason Status Update     Strip reviewed with CNM. Provider aware of decelerations and corrective measures. Plan to give 500 mL bolus due to more periods of minimal variability and decelerations.

## 2023-08-09 NOTE — BRIEF OP NOTE
[facility]    Brief Operative Note    Pre-operative diagnosis:  delivery w/o mention of indication, luis anne [O82]  Post-operative diagnosis Same as pre-operative diagnosis    Procedure: Procedure(s):   section  Surgeon: Surgeon(s) and Role:     * Nadia Clark MD - Primary     * Azalea Qiu MD - Assisting     * Beatriz Espinoza MD - Resident - Assisting     * Tammy Savage MD - Resident - Assisting     * Sharon Mims MD - Resident - Observing  Anesthesia: General   Estimated Blood Loss: 1400cc   IVF: 1000cc  Hemostatic agents: TXA x2   Drains: Transurethral Sher catheter     Specimens:   ID Type Source Tests Collected by Time Destination   A :  Tissue Umbilical Cord SEE PROVIDERS ORDERS Nadia Clark MD 2023  6:00 PM    B :  Cord blood Umbilical Cord OR DOCUMENTATION ONLY Nadia Clark MD 2023  6:00 PM    C :  Cord blood, arterial Umbilical Cord OR DOCUMENTATION ONLY Nadia Clark MD 2023  6:01 PM    D :  Cord blood, venous Umbilical Cord OR DOCUMENTATION ONLY Nadia Clark MD 2023  6:01 PM    E :  Placenta Placenta SEE PROVIDERS ORDERS Nadia Clark MD 2023  6:01 PM      Findings:   Significant extension from right aspect of hysterotomy through cervix. Recommend serial cervical lengths in all future pregnancies .    Complications: None.    Implants: * No implants in log *    eBatriz Espinoza MD   OB/GYN PGY-4  23 7:00 PM

## 2023-08-09 NOTE — PROGRESS NOTES
Labor Progress Note    Subjective: Yusra is up walking around unit, just had large amount of bloody show with clots while standing at bedside. Denies pain at site of previously     Objective:  /64   Pulse 66   Temp 97.9  F (36.6  C) (Oral)   Resp 16   LMP 2022 (Approximate)    General appearance: uncomfortable with contractions.  Contractions: Every 2-3 minutes.  seconds duration.  Palpate: moderate.  FHR: continuous EFM- baseline 145 with moderate variability and positive accelerations. No decelerations.  Assessment: EFM interpretation suggests absence of concern for fetal metabolic acidemia at this time due to accelerations present, heart rate: normal baseline, and variability: moderate    ROM: not ruptured  PELVIC EXAM: 4.5/ 60%/ Mid/ soft/ -2. Large amount of bloody show.   Pitocin - 14 mu/min.,  Antibiotics - none    Assessment:  IUP @ 40w0d for induction of labor.  Indication: Hx term fetal demise.    Fetal Heart Rate Tracing category one  GBS - negative  TOLAC, h/o c/s x1  Patient Active Problem List   Diagnosis    Dental caries    High risk pregnancy, antepartum    History of postpartum hemorrhage    History of stillbirth    MDD (major depressive disorder), recurrent episode, moderate (H)    Nausea and vomiting during pregnancy    Vitamin D deficiency    History of birth trauma, stillborn baby [facility], see note from 7/3/23    History of  delivery    Inadequate social support    Need for immunization against viral hepatitis    Labor and delivery, indication for care       Plan:  - Reviewed labor progress and abnormal bleeding. Plan to monitor closely. Dr. Clark currently in OR, updated G2 Dr. Mims on large amount of bloody show.   - Offered AROM, pt declines. Would like to wait for  to return then proceed with AROM.   - Reviewed options for pain medication, declines.   - Given increased bleeding and frequent ctx although not meeting criteria for tachysystole recommend  decreasing pitocin to 7mU.   - Ambulation, hydration, position changes, birthing ball/sling and tub options to facilitate labor.  - Reevaluate in 1-2 hours/sooner prn.     GEOVANY Moon CNM

## 2023-08-09 NOTE — PROGRESS NOTES
OB Consult Note    Contacted by Keyanna Deng CNM to assess Yusra Gonzales for labor/pushing assessment.     S: Patient has been pushing for approximately 45 minutes. She is feeling tired and like she is not going to be able to push the baby out. She would like to get the baby here and is requesting assistance with this. Feels exhausted.      O:  /61   Pulse 66   Temp 98  F (36.7  C) (Oral)   Resp 16   LMP 2022 (Approximate)   Gen: Tired appearing, moderate distress with contractions. Partner at bedside. Pushing with contractions.   SVE: 10/100/+1    FHT: 135 bpm baseline, moderate variability, + accels, decelerations with contractions. Good fetal recovery to baseline.   [place]: 4-6 contractions in 10 minutes    A/P: 33 year old yo  at 40w0d who was admitted on 23 for scheduled IOL secondary to history of term IUFD with last pregnancy. Patient has a history of CS x1 and desired TOLAC on admission. Labor course was notable for augmentation with pitocin, AROM performed at appx 4.5 cm with bloody fluid. Patient quickly progressed to complete approximately 1 hour after AROM was performed and has been pushing for approximately 45 mins. OBGYN team was consulted by [facility] service for evaluation of labor evaluation/maternal request. On my entry into the room patient pushing with contractions. SVE 10/100/+1, pushing to +2. MARY. Patient stating she is tired and requesting to move to a  section. Patient is primarily Nepali speaking and expresses preferences for her  to translate for her. I had extensive discussion with patient and explained that I do think with directed pushing and good maternal effort she could have a vaginal delivery. I discussed operative delivery with vacuum given optimal positioning of baby for this.  I also discussed with patient given fetal station is +2, a  section at this stage would have slightly higher associated risks. Following our conversation patient  and  still requesting to move to . At this stage I requested an Yoruba  to discuss specific risks of  which patient and  were amendable to. Using professional Yoruba speaking , I discussed risks, benefits, and alternatives of  section were discussed, including the risks of bleeding, infection, injury to surrounding organs, fetal injury, and remote risks of hysterectomy. She had time to ask questions, and again confirmed her desire to proceed. Verbal consent was provided for  section and blood transfusion in the setting of hemorrhage or acute blood loss anemia was provided.     I reviewed the above with Dr. Clark, who separately went to patient room to discuss. Ultimately decision was made to call STAT  section.     Tammy Savage DO, MS  Obstetrics, Gynecology & Women's Health   Resident, PGY-3  2023 5:20 PM    Staff MD Note    I appreciate the note by Dr. Savage.  Any necessary changes have been made by me.  I saw and evaluated the patient and agree with the findings and plan of care as documented in the note.  Patient requesting to move forward with  section per maternal request and feels like people are not listening to her.  She is in a lot of pain and states she can no longer move forward with attempt at vaginal delivery.   communicates concern that things are similar to last delivery when they had a loss.  They do not want this to happen again and desire to move forward with  section.  As we were arranging for this with anesthesia, patient and  concerned things were not happening quickly enough and she was having continued worsening pain.  I discussed if she wanted things done quickly we could put her to sleep and call a stat  section.  There verbalized desire to proceed in this manner.  STAT  section called and moved patient to OR.    Nadia Clark MD

## 2023-08-09 NOTE — ANESTHESIA PROCEDURE NOTES
Airway       Patient location during procedure: OR       Procedure Start/Stop Times: 8/9/2023 5:32 PM  Staff -        Anesthesiologist:  Montez Leal MD       Resident/Fellow: Jessica Alexander MD       Performed By: resident  Consent for Airway        Urgency: emergent  Indications and Patient Condition       Indications for airway management: nathan-procedural       Induction type:RSI       Mask difficulty assessment: 0 - not attempted    Final Airway Details       Final airway type: endotracheal airway       Successful airway: ETT - single and Oral  Endotracheal Airway Details        ETT size (mm): 6.0       Cuffed: yes       Successful intubation technique: direct laryngoscopy and video laryngoscopy       VL Blade Size: MAC 3       Grade View of Cords: 1       Position: Right       Measured from: gums/teeth       Secured at (cm): 23       Bite block used: None    Post intubation assessment        Placement verified by: capnometry, equal breath sounds and chest rise        Number of attempts at approach: 1       Secured with: pink tape       Ease of procedure: easy       Dentition: Intact and Unchanged    Medication(s) Administered   Medication Administration Time: 8/9/2023 5:32 PM

## 2023-08-09 NOTE — PROGRESS NOTES
Labor Progress Note    Subjective: Yusra is resting in bed, feeling mildly uncomfortable with contractions.     Objective:  /66   Pulse 66   Temp 97.4  F (36.3  C) (Oral)   Resp 16   LMP 2022 (Approximate)    General appearance: uncomfortable .  Contractions: Every 2-3 minutes. 60-90 seconds duration.  Palpate: moderate.  FHR: continuous EFM- baseline 140 with moderate variability and absent accelerations. No decelerations.  Assessment: EFM interpretation suggests absence of concern for fetal metabolic acidemia at this time due to variability: moderate    ROM: not ruptured  PELVIC EXAM: 4/ 50%/ Posterior/ soft/ -2, moderate amount of bloody show  Pitocin - 14 mu/min.,  Antibiotics - none    Assessment:  IUP @ 40w0d for induction of labor.  Indication: Hx term fetal demise    Fetal Heart Rate Tracing category one  GBS - negative  TOLAC, h/o  x1   Patient Active Problem List   Diagnosis    Dental caries    High risk pregnancy, antepartum    History of postpartum hemorrhage    History of stillbirth    MDD (major depressive disorder), recurrent episode, moderate (H)    Nausea and vomiting during pregnancy    Vitamin D deficiency    History of birth trauma, stillborn baby [facility], see note from 7/3/23    History of  delivery    Inadequate social support    Need for immunization against viral hepatitis    Labor and delivery, indication for care       Plan:  - Discussed labor progress, offered AROM. R/b/a reviewed. Pt declines, would like to get up and walk around and reevaluate for AROM in 2-4 hours.   - Ambulation, hydration, position changes, birthing ball/sling and tub options to facilitate labor.  - Pain medication options of Nitrous Oxide, Fentanyl IV and epidural reviewed with pt. Pt is considering all options.   - Continue labor induction with Pitocin   - Reevaluate in 2-4 hours/sooner prn  GEOVANY Moon CNM

## 2023-08-09 NOTE — ANESTHESIA PROCEDURE NOTES
"TAP Procedure Note    Pre-Procedure   Staff -        Anesthesiologist:  Montez Leal MD       Resident/Fellow: Jessica Alexander MD       Performed By: with residents       Procedure performed by resident/fellow/CRNA in presence of a teaching physician.         Location: post-op       Pre-Anesthestic Checklist: patient identified, IV checked, site marked, risks and benefits discussed, informed consent, monitors and equipment checked, pre-op evaluation, at physician/surgeon's request and post-op pain management  Timeout:       Correct Patient: Yes        Correct Procedure: Yes        Correct Site: Yes        Correct Position: Yes        Correct Laterality: Yes        Site Marked: Yes  Procedure Documentation  Procedure: TAP       Laterality: bilateral       Patient Position: supine       Skin prep: Chloraprep       Insertion Site: T8-9.       Needle Type: short bevel       Needle Gauge: 21.        Needle Length (millimeters): 110        Ultrasound guided       1. Ultrasound was used to identify targeted nerve, plexus, vascular marker, or fascial plane and place a needle adjacent to it in real-time.       2. Ultrasound was used to visualize the spread of anesthetic in close proximity to the above referenced structure.       3. A permanent image is entered into the patient's record.    Assessment/Narrative         The placement was negative for: blood aspirated, painful injection and site bleeding       Paresthesias: No.       Insertion/Infusion Method: Single Shot       Complications: none    Medication(s) Administered   Bupivacaine 0.25% PF (Infiltration) - Infiltration   20 mL -   Bupivacaine liposome (Exparel) 1.3% LA inj susp (Infiltration) - Infiltration   20 mL -     FOR [facility] ([facility]) ONLY:   Pain Team Contact information: please page the Pain Team Via [employer]. Search \"Pain\". During daytime hours, please page the attending first. At night please page the resident first.      "

## 2023-08-09 NOTE — PROVIDER NOTIFICATION
08/08/23 1962   Provider Notification   Provider Name/Title LAMAR Herrera CNM   Method of Notification Electronic Page   Request Evaluate in Person   Notification Reason Status Update     Balloon came out with slight pulling. Would you like to perform SVE? Thanks!    CNM came to the bedside to assess pt. Pt declined SVE at this time. Orders being placed to start pitocin per provider. Pt agreeable with plan. No further questions noted from pt at this time.

## 2023-08-09 NOTE — PROGRESS NOTES
Labor Progress Note    Subjective: Yusra is feeling the same with contractions,  Asmnataliia here.     Objective:  BP 98/54   Pulse 66   Temp 98  F (36.7  C) (Oral)   Resp 16   LMP 2022 (Approximate)    General appearance: uncomfortable with contractions.  Contractions: Every 2-4 minutes.  seconds duration.  Palpate: moderate.  FHR: continuous EFM- baseline 150 with moderate variability and positive accelerations. No decelerations.  Assessment: EFM interpretation suggests absence of concern for fetal metabolic acidemia at this time due to variability: moderate    ROM: bloody fluid  PELVIC EXAM: 5/ 60%/ Mid/ soft/ -2   Pitocin - 7 mu/min.,  Antibiotics - none    Assessment:  IUP @ 40w0d for induction of labor.  Indication: Hx term fetal demise    Fetal Heart Rate Tracing category one  GBS - negative  TOLAC, h/o c/s x1  Patient Active Problem List   Diagnosis    Dental caries    High risk pregnancy, antepartum    History of postpartum hemorrhage    History of stillbirth    MDD (major depressive disorder), recurrent episode, moderate (H)    Nausea and vomiting during pregnancy    Vitamin D deficiency    History of birth trauma, stillborn baby [facility], see note from 7/3/23    History of  delivery    Inadequate social support    Need for immunization against viral hepatitis    Labor and delivery, indication for care       Plan:  - Dr. Clark out of OR, updated on increased bloody show with clots. MD recommends additional lab work, orders placed.   - Recommended AROM, reviewed r/b/a with patient and . Pt agreeable. AROM done for moderate amt of bloody fluid at 1411.   - Pt and  inquiring about next steps in induction now that membranes ruptured. Discussed would not anticipate any further intervention for several hours unless clinically indicated, continue to titrate pitocin per protocol.   - Answered pt and  questions regarding the option for vaginal birth vs repeat .  Reviewed that she may elect for a  at any point. Pt verbalize understanding, prefers to continue with TOLAC at this time.   - Ambulation, hydration, position changes, birthing ball/sling and tub options to facilitate labor.  - Pain medication options of Nitrous Oxide, Fentanyl IV and epidural reviewed with pt. Pt is considering all options.   - Close observation  - Reevaluate in 2-4 hours/sooner prn     GEOVANY Moon CNM

## 2023-08-09 NOTE — PROGRESS NOTES
"Late entry due to patient acuity on L&D    Labor Progress Note    Subjective: Yusra is feeling an urge to push.     Objective:  /61   Pulse 66   Temp 98  F (36.7  C) (Oral)   Resp 16   LMP 2022 (Approximate)    General appearance: uncomfortable with contractions.  Contractions: Every 1-3 minutes.  seconds duration.    FHR: continuous EFM- baseline 140 with moderate variability and absent accelerations. Intermittent variable decelerations with pushing effort.   Assessment: EFM interpretation suggests absence of concern for fetal metabolic acidemia at this time due to heart rate: normal baseline and variability: moderate    ROM: bloody/clear fluid x3 hours  PELVIC EXAM: 10/ 100%/ Mid/ soft/ +3   Pitocin - 7 mu/min.,  Antibiotics - none    Assessment:  IUP @ 40w0d for induction of labor.  Indication: Hx of term fetal demise    Fetal Heart Rate Tracing category two  GBS - negative  TOLAC, h/o    Patient Active Problem List   Diagnosis    Dental caries    High risk pregnancy, antepartum    History of postpartum hemorrhage    History of stillbirth    MDD (major depressive disorder), recurrent episode, moderate (H)    Nausea and vomiting during pregnancy    Vitamin D deficiency    History of birth trauma, stillborn baby [facility], see note from 7/3/23    History of  delivery    Inadequate social support    Need for immunization against viral hepatitis    Labor and delivery, indication for care       Plan:  - Patient initially pushing well with guidance from CNM/RN, however after approximately 30 min of pushing pt and spouse are requesting \"assistance\" from doctor and Yusra states she \"can no longer push\" and requests a . Consult from MD team requested, see MD note.   - CNM remains at bedside.     GEOVANY Moon CNM    ADDENDUM [id number]   After consultation received from MD team risks and benefits of vacuum delivery vs.  delivery were reviewed with patient and spouse; pt " elects for  section. Given patient and 's experience with last birth they are requesting operation be done STAT. Pt transferred to OR and care to MD team. See MD note.   Megan Deng, GEOVANY BARBERM

## 2023-08-10 LAB
ERYTHROCYTE [DISTWIDTH] IN BLOOD BY AUTOMATED COUNT: 12.8 % (ref 10–15)
ERYTHROCYTE [DISTWIDTH] IN BLOOD BY AUTOMATED COUNT: 13.1 % (ref 10–15)
HBV SURFACE AG SERPL QL IA: NONREACTIVE
HCT VFR BLD AUTO: 31.1 % (ref 35–47)
HCT VFR BLD AUTO: 34 % (ref 35–47)
HGB BLD-MCNC: 10.7 G/DL (ref 11.7–15.7)
HGB BLD-MCNC: 11.5 G/DL (ref 11.7–15.7)
HIV 1+2 AB+HIV1 P24 AG SERPL QL IA: NONREACTIVE
HIV 1+2 AB+HIV1P24 AG SERPLBLD IA.RAPID: NON REACTIVE
HIV 1+2 AB+HIV1P24 AG SERPLBLD IA.RAPID: REACTIVE
HIV INTERPRETATION: ABNORMAL
MCH RBC QN AUTO: 31.7 PG (ref 26.5–33)
MCH RBC QN AUTO: 31.9 PG (ref 26.5–33)
MCHC RBC AUTO-ENTMCNC: 33.8 G/DL (ref 31.5–36.5)
MCHC RBC AUTO-ENTMCNC: 34.4 G/DL (ref 31.5–36.5)
MCV RBC AUTO: 93 FL (ref 78–100)
MCV RBC AUTO: 94 FL (ref 78–100)
PLATELET # BLD AUTO: 140 10E3/UL (ref 150–450)
PLATELET # BLD AUTO: 149 10E3/UL (ref 150–450)
RBC # BLD AUTO: 3.35 10E6/UL (ref 3.8–5.2)
RBC # BLD AUTO: 3.63 10E6/UL (ref 3.8–5.2)
WBC # BLD AUTO: 14 10E3/UL (ref 4–11)
WBC # BLD AUTO: 15.8 10E3/UL (ref 4–11)

## 2023-08-10 PROCEDURE — 36415 COLL VENOUS BLD VENIPUNCTURE: CPT | Performed by: INTERNAL MEDICINE

## 2023-08-10 PROCEDURE — 250N000013 HC RX MED GY IP 250 OP 250 PS 637: Performed by: STUDENT IN AN ORGANIZED HEALTH CARE EDUCATION/TRAINING PROGRAM

## 2023-08-10 PROCEDURE — 85027 COMPLETE CBC AUTOMATED: CPT | Performed by: OBSTETRICS & GYNECOLOGY

## 2023-08-10 PROCEDURE — 250N000013 HC RX MED GY IP 250 OP 250 PS 637

## 2023-08-10 PROCEDURE — 36415 COLL VENOUS BLD VENIPUNCTURE: CPT | Performed by: STUDENT IN AN ORGANIZED HEALTH CARE EDUCATION/TRAINING PROGRAM

## 2023-08-10 PROCEDURE — 85027 COMPLETE CBC AUTOMATED: CPT | Performed by: STUDENT IN AN ORGANIZED HEALTH CARE EDUCATION/TRAINING PROGRAM

## 2023-08-10 PROCEDURE — 999N000104 HEPATITIS C RNA, QUANTITATIVE BY PCR: Performed by: INTERNAL MEDICINE

## 2023-08-10 PROCEDURE — 120N000002 HC R&B MED SURG/OB UMMC

## 2023-08-10 PROCEDURE — 250N000011 HC RX IP 250 OP 636: Performed by: OBSTETRICS & GYNECOLOGY

## 2023-08-10 PROCEDURE — 36415 COLL VENOUS BLD VENIPUNCTURE: CPT | Performed by: OBSTETRICS & GYNECOLOGY

## 2023-08-10 RX ORDER — LIDOCAINE 4 G/G
2 PATCH TOPICAL
Status: DISCONTINUED | OUTPATIENT
Start: 2023-08-10 | End: 2023-08-12 | Stop reason: HOSPADM

## 2023-08-10 RX ORDER — KETOROLAC TROMETHAMINE 30 MG/ML
30 INJECTION, SOLUTION INTRAMUSCULAR; INTRAVENOUS EVERY 6 HOURS PRN
Status: DISCONTINUED | OUTPATIENT
Start: 2023-08-10 | End: 2023-08-12 | Stop reason: HOSPADM

## 2023-08-10 RX ADMIN — SENNOSIDES AND DOCUSATE SODIUM 2 TABLET: 50; 8.6 TABLET ORAL at 08:08

## 2023-08-10 RX ADMIN — KETOROLAC TROMETHAMINE 30 MG: 30 INJECTION, SOLUTION INTRAMUSCULAR; INTRAVENOUS at 14:54

## 2023-08-10 RX ADMIN — OXYCODONE HYDROCHLORIDE 5 MG: 5 TABLET ORAL at 11:56

## 2023-08-10 RX ADMIN — ACETAMINOPHEN 975 MG: 325 TABLET, FILM COATED ORAL at 11:56

## 2023-08-10 RX ADMIN — ACETAMINOPHEN 975 MG: 325 TABLET, FILM COATED ORAL at 05:51

## 2023-08-10 RX ADMIN — ACETAMINOPHEN 975 MG: 325 TABLET, FILM COATED ORAL at 17:59

## 2023-08-10 RX ADMIN — LIDOCAINE PATCH 4% 2 PATCH: 40 PATCH TOPICAL at 08:08

## 2023-08-10 RX ADMIN — SIMETHICONE 80 MG: 80 TABLET, CHEWABLE ORAL at 09:16

## 2023-08-10 RX ADMIN — OXYCODONE HYDROCHLORIDE 5 MG: 5 TABLET ORAL at 17:59

## 2023-08-10 RX ADMIN — KETOROLAC TROMETHAMINE 30 MG: 30 INJECTION, SOLUTION INTRAMUSCULAR; INTRAVENOUS at 08:28

## 2023-08-10 RX ADMIN — ACETAMINOPHEN 975 MG: 325 TABLET, FILM COATED ORAL at 23:38

## 2023-08-10 RX ADMIN — SENNOSIDES AND DOCUSATE SODIUM 2 TABLET: 50; 8.6 TABLET ORAL at 21:10

## 2023-08-10 ASSESSMENT — ACTIVITIES OF DAILY LIVING (ADL)
ADLS_ACUITY_SCORE: 18

## 2023-08-10 NOTE — OR NURSING
Pt's recovery from general anesthesia progressing as expected. Pt is hemodynamically stable on IVMF and room air in PACU. Pt appropriately drowsy at time but asking questions and responding all questions fully and promptly. Patient is normothermic - however reports feeling cold thus continuing to utilize warming blanket for comfort. Dr. Mat MARTINEZ called and updated, signout from general anesthesia recovery received. Patient will remain in L&D PACU for standard monitor s/p  for 2 hours with L&D recovery nurse.

## 2023-08-10 NOTE — PROVIDER NOTIFICATION
08/10/23 0200   Provider Notification   Provider Name/Title Dr Roper   Method of Notification Electronic Page   Request Evaluate-Remote   Notification Reason Status Update     pt is tired and want to sleep. she does not want to get up and remove the Sher, can you please put a order. thanks

## 2023-08-10 NOTE — PROVIDER NOTIFICATION
08/10/23 1856   Provider Notification   Provider Name/Title Dr Mims G2   Method of Notification Electronic Page   Request Evaluate-Remote   Notification Reason Lab Results  (Please look at recent resulted labs and follow up with nurse staff. Pt resulted positive for HIV1 P24 Antigen. Thanks!)

## 2023-08-10 NOTE — ANESTHESIA POSTPROCEDURE EVALUATION
Patient: Yusra Gonzales    Procedure: Procedure(s):   section       Anesthesia Type:  No value filed.    Note:  Disposition: Inpatient   Postop Pain Control: Uneventful            Sign Out: Well controlled pain   PONV: No   Neuro/Psych: Uneventful            Sign Out: Acceptable/Baseline neuro status   Airway/Respiratory: Uneventful            Sign Out: Acceptable/Baseline resp. status   CV/Hemodynamics: Uneventful            Sign Out: Acceptable CV status; No obvious hypovolemia; No obvious fluid overload   Other NRE:    DID A NON-ROUTINE EVENT OCCUR?            Last vitals:  Vitals Value Taken Time   /66 23   Temp 37  C (98.6  F) 23   Pulse 85 23   Resp 20 23   SpO2 98 % 23       Electronically Signed By: Elmer Rivero MD  2023  9:21 PM

## 2023-08-10 NOTE — CONSULTS
"Social Work Progress Note      DATA    Patient is a 33 year old female diagnosed with Labor and delivery, indication for care. Assessment completed with patient.    ASSESSMENT    Patient appeared engaged during visit today. Patient appeared very tired during visit today. She reported having pain from her . Patient reported being very thankful she has a healthy baby but also feeling sad regarding the stat . Patient followed up with [employer] and Liliana from [employer] is delivering a car seat to the hospital at 20:00 tonight. Patient will ask Liliana about a postpartum  and crib. If Liliana is unable to provide a crib, SW can provide a pack and play from [employer]. Patient reported that her NP from [facility] was going to make a referral for a postpartum  and/or a visiting nurse to provide postpartum support. NIK will follow up with Jess to see if that referral has been placed. Patient reported feeling happy her partner had come to support her during the delivery. She does not anticipate him staying here for long as they \"lead separate lives.\"     She will live alone with her baby. Patient declined referral for [place] [facility], stating it was too far to travel and that she does not feel stressed. SW affirmed her happiness with healthy baby and stated that patient can always ask for new mom resources from baby's pediatrician if she decides she would like more connection. Patient reported having only a cousin nearby for family and friends.     Patient is connected with [facility] but recently lost her wallet which included her 's phone number. SW provided patient with [facility] number so she can call and add baby to her case.        INTERVENTION    Conducted chart review and consulted with medical team regarding plan of care.  Provided assessment of patient and family's level of coping  Validated emotions and provided supportive listening  Collaborated with professionals in community to " meet patient and family's needs  Facilitated service linkage with hospital and community resources    Patient signed STEPHANIE for SW to make referral to visiting nurse through [facility]. SW submitted the referral.    PLAN    Continue care. Christine La will follow through admission.    LENORA Hester [facility]   On call pager number: [phone]

## 2023-08-10 NOTE — LACTATION NOTE
This note was copied from a baby's chart.  Consult for:  BF support, first time breastfeeding mother    Infant Name: undecided    Infant's Primary Care Clinic: undecided    Delivery Information:  Baby vitor was born at Gestational Age: 40w0d via   delivery on 2023 5:33 PM     Maternal Health History:    Information for the patient's mother:  Yusra Gonzales [[id number]]     Patient Active Problem List   Diagnosis    Dental caries    High risk pregnancy, antepartum    History of postpartum hemorrhage    History of stillbirth    MDD (major depressive disorder), recurrent episode, moderate (H)    Nausea and vomiting during pregnancy    Vitamin D deficiency    History of birth trauma, stillborn baby [facility], see note from 7/3/23    History of  delivery    Inadequate social support    Need for immunization against viral hepatitis    Labor and delivery, indication for care          Maternal Breast Exam/Breastfeeding History:  Yusra noted breast growth and sensitivity in early pregnancy. She denies any history of breast/chest injury or surgery. Her breasts are soft and symmetrical with bilateral intact nipples. She has been able to hand express colostrum. ?    Infant information: Baby vitor has age appropriate output and weight loss.      Weight Change Since Birth: 0% at 1 day old     Oral exam of baby:  not assessed-mother breastfeeding at time of consult      Feeding Assessment:  Baby latched in football hold, appears deep and mother reports it is comfortable. Reviewed positioning and signs of a good latch. Encouraged breastfeeding on demand, frequent STS and using pillows to support baby at the breast.     Education:   - Expected  feeding patterns in the first few days (pg. 38 of Your Guide to To Postpartum and  Care)/ the Second Night  - Stages of milk production  - Benefits of hand expression of colostrum  - Early feeding cues     - Benefits of feeding on cue  - Benefits of skin to skin  -  Breastfeeding positions  - Tips to get and maintain a deep latch  - How to tell when baby is finished  - How to tell if baby is getting enough  - Expected  output  - Infant Feeding Log  - Signs breastfeeding is going well (comfortable latch, audible swallows, age appropriate output and weight loss)    - Inpatient breastfeeding support  - Outpatient lactation resources    Handouts: Infant Feeding Log (Week 1, Your Guide to Postpartum & [place] Care Book) and [facility] Lactation Resources    Plan: Continue breastfeeding on cue with RN support as needed with a goal of 8-12 feedings per day.     Encourage frequent skin to skin, breast massage and hand expression.     Encouraged follow up with outpatient lactation consultant  as needed after discharge. Family plans to follow up with undecided.    Vietnamese video  used for visit.         Tricia Zacarias RN, IBCLC   Lactation Consultant  Ascom: *[phone]  Office: [phone]

## 2023-08-10 NOTE — CARE PLAN
Patient arrived to [facility] unit via zoom cart at 2055 ,with belongings, accompanied by spouse/ significant other, with infant in arms. Received report from  Lorenza   and checked bands. Unit and room orientation completd. Call light given; no concerns present at this time. Continue with plan of care.

## 2023-08-10 NOTE — PLAN OF CARE
Goal Outcome Evaluation:         Vital signs stable. Postpartum assessment WDL. Incision clean, dry, intact with dressing in place. Lochia is light, Pt taking  tylenol for pain, Manzanares is in place, pt want to keep the manzanares and provider was notified and order to remove this morning at 8am, breast feeding. Pt is agreeable with her plan of care. Positive attachment behaviors observed with infant. Support person present. Will continue with plan of care.    Goal Outcome Evaluation:       Plan of Care Reviewed With: patient  Overall Patient Progress: improving

## 2023-08-10 NOTE — PLAN OF CARE
TRANSFER TO [facility] NOTE    Data: Yusra Gonzales transferred to [facility] via zoom cart at 2040. Baby transferred via parent's arms.  Action: Receiving unit notified of transfer: Yes. Patient and family notified of room change. Report given to Mary at 2050. Belongings sent to receiving unit. Accompanied by Registered Nurse. Oriented patient to surroundings. Call light within reach. ID bands double-checked with receiving RN.  Response: Patient tolerated transfer and is stable.

## 2023-08-10 NOTE — DISCHARGE SUMMARY
[facility] Discharge Summary    Patient: Yusra Gonzales    YOB: 1990   MRN# [id number]           Date of Admission:  2023  Date of Discharge::  2023  Admitting Physician:  GEOVANY [facility]  Discharge Physician:  Lena Martinez MD           Admission Diagnoses:   - Intrauterine pregnancy at 40w0d  - Hx Term IUFD  - Hx CS x1  - MDD/JACKIE  - PTSD  - Recent immigration          Discharge Diagnosis:   - Same, delivered   - Failed TOLAC  - Maternal exhaustion in labor  - Postpartum hemorrhage secondary to uterine atony         Procedures:     Procedure(s): STAT Repeat low transverse  section with double layer closure via Pfannenstiel skin incision under GETA            Medications Prior to Admission:     Medications Prior to Admission   Medication Sig Dispense Refill Last Dose    Prenatal Vit-Fe Fumarate-FA (PRENATAL MULTIVITAMIN W/IRON) 27-0.8 MG tablet Take 1 tablet by mouth daily at 2 pm   2023    acetaminophen (TYLENOL) 500 MG tablet Take 500 mg by mouth (Patient not taking: Reported on 7/3/2023)       carboxymethylcellulose (REFRESH PLUS) 0.5 % SOLN ophthalmic solution Place 1 drop into both eyes 4 times daily (Patient not taking: Reported on 7/3/2023) 30 mL 11     carboxymethylcellulose PF (CARBOXYMETHYLCELLULOSE SODIUM) 0.5 % ophthalmic solution Place 1 drop into both eyes 3 times daily as needed for dry eyes (Patient not taking: Reported on 7/3/2023) 50 each 0     Vitamin D3 (CHOLECALCIFEROL) 25 mcg (1000 units) tablet Take 1 tablet by mouth daily (Patient not taking: Reported on 2023)           Discharge Medications:        Review of your medicines        START taking        Dose / Directions   docusate sodium 100 MG capsule  Commonly known as: COLACE  Used for: Status post  section      Dose: 100 mg  Take 1 capsule (100 mg) by mouth 2 times daily  Quantity: 60 capsule  Refills: 0     ibuprofen 600 MG  tablet  Commonly known as: ADVIL/MOTRIN  Used for: Status post  section      Dose: 600 mg  Take 1 tablet (600 mg) by mouth every 6 hours as needed for moderate pain  Quantity: 60 tablet  Refills: 0     oxyCODONE 5 MG tablet  Commonly known as: ROXICODONE  Used for: Status post  section      Dose: 5 mg  Take 1 tablet (5 mg) by mouth every 6 hours as needed for severe pain or moderate to severe pain  Quantity: 6 tablet  Refills: 0            CONTINUE these medicines which may have CHANGED, or have new prescriptions. If we are uncertain of the size of tablets/capsules you have at home, strength may be listed as something that might have changed.        Dose / Directions   acetaminophen 325 MG tablet  Commonly known as: TYLENOL  This may have changed:   medication strength  how much to take  when to take this  Used for: Status post  section      Dose: 650 mg  Take 2 tablets (650 mg) by mouth every 6 hours  Quantity: 60 tablet  Refills: 0            STOP taking      carboxymethylcellulose 0.5 % Soln ophthalmic solution  Commonly known as: REFRESH PLUS        carboxymethylcellulose PF 0.5 % ophthalmic solution  Commonly known as: carboxymethylcellulose sodium        prenatal multivitamin w/iron 27-0.8 MG tablet        Vitamin D3 25 mcg (1000 units) tablet  Commonly known as: CHOLECALCIFEROL                  Where to get your medicines        These medications were sent to [facility] [place], [place] - [address]  [address] [address]      Phone: [phone]   acetaminophen 325 MG tablet  docusate sodium 100 MG capsule  ibuprofen 600 MG tablet  oxyCODONE 5 MG tablet          Consultations:   - OBGYN  - Anesthesia  - Lactation  - Social Work     Brief Admission History:   Ms. Yusra Gonzales is a 33 year old now P1 who initially presented at 40w0d as primary patient of [facility] service for IOL secondary to history of term IUFD.      Hospital Course   Labor  course was notable for augmentation with pitocin, AROM performed at appx 4.5cm with bloody fluid. Patient quickly progressed to complete approximately 1 hour after AROM was performed and has been pushing for approximately 45mins. OBGYN team was consulted by [facility] service for evaluation of labor evaluation/maternal request. On my entry into the room patient pushing with contractions. SVE 10/100/+1, pushing to +2. MARY. Patient stating she is tired and requesting to move to a  section. Following extensive counseling including risks of  section at this stage, patient desired to proceed to OR. Using professional Belarusian speaking , discussed risks, benefits, and alternatives of  section were discussed. Verbal consent obtained.       Intraoperative course   The procedure was complicated by hysterotomy extension through cervix, postpartum hemorrhage secondary to uterine atony and extensive uterine repair.  EBL 1712 mL.  See operative report for details.     Operative Findings:   Significant midline extension from hysterotomy approximately 7cm extending through the cervix. Repaired. Floseal placed over extension and hysterotomy. Hemostatic prior to fascial closure.  Moderate rectofascial adhesions. Minimal intraabdominal adhesions.  Scant amount of clear amniotic fluid  Vigorous male infant in vertex presentation. Apgars 8 at 1 minute & 9 at 5 minutes. Weight 3.27 kg (7 lb 3.3 oz).  Grossly normal appearing  uterus, fallopian tubes, and ovaries.     Due to significant hysterotomy extension into cervix, would recommend serial cervical length measurements, and delivery via  at 38 weeks in any future pregnancies. Patient should not labor.         Postpartum Course   The patient's hospital course was unremarkable.  She recovered as anticipated and experienced no post-operative complications. On discharge, her pain was well controlled. Vaginal bleeding is similar to peak menstrual flow.   Voiding without difficulty.  Ambulating well and tolerating a normal diet.  No fever or significant wound drainage.  Breastfeeding well.  Infant is stable. Having bowel movements.  She was discharged on post-partum day #3.    Post-partum hemoglobin:   Hemoglobin   Date Value Ref Range Status   08/10/2023 10.7 (L) 11.7 - 15.7 g/dL Final             Discharge Instructions and Follow-Up:     Discharge diet: Regular   Discharge activity: No lifting greater than 20 lbs, pushing, pulling, or other strenuous activity for 6 weeks. Pelvic rest for 6 weeks including no sexual intercourse, tampons, or douching. No driving until you can slam on the breaks without pain or while on narcotic pain medications.    Discharge follow-up: Follow up with primary OB for incision check in 2 weeks, and routine postpartum visit in 6 weeks.   Wound care: Keep incision clean and dry           Discharge Disposition:     Discharged to home      Alyssa Caldera MD, MPH, MS (PGY1)   Obstetrics, Gynecology & Women's Health   Resident, PGY-1  08/12/2023 8:21 AM       I personally examined and evaluated Yusra Gonzales on 8/12/2023.  I agree with the presentation, hospital details and plan of care this discharge summary with edits by me.   Lena Martinez MD MPH

## 2023-08-10 NOTE — PROGRESS NOTES
Obstetrics Postpartum Progress Note  DOS: 08/10/23  MRN: [id number]    POD#1 after RLTCS     S: Patient states she is doing well.  Has pain but has not tried oxycodone. Lochia minimal though she has not yet been out of bed.  Eating and drinking without nausea/vomiting.  She is passing flatus. Starting to ambulate. Manzanares remains in place.  Denies headaches, vision changes, chest pain, SOB.  Breastfeeding. No other acute concerns.  O:  Vitals:    08/10/23 0030 08/10/23 0130 08/10/23 0520 08/10/23 0800   BP: 105/65 111/65 98/59    BP Location: Right arm Right arm Right arm    Patient Position: Semi-Mccormack's Semi-Mccormack's Semi-Mccormack's    Cuff Size: Adult Regular Adult Regular Adult Regular    Pulse: 81 100 89    Resp: 20 20 20 18   Temp: 99.7  F (37.6  C) 99  F (37.2  C) 98.9  F (37.2  C) 98  F (36.7  C)   TempSrc: Oral Oral Oral Oral   SpO2:  100% 98%      Gen:  NAD  CV: Regular rate, well perfused  Resp: Nonlabored on room air, normal inspiratory effort  Abd: soft, nondistended, nontender, fundus firm at 2 cm below umbilicus  Incision:  clean, dry, intact    Labs:  Hemoglobin   Date Value Ref Range Status   08/10/2023 10.7 (L) 11.7 - 15.7 g/dL Final   08/10/2023 11.5 (L) 11.7 - 15.7 g/dL Final       No results found for: ALEJANDRINA    Assessment and Plan: 33 year old  POD#1 s/p STAT RLTCS, doing well postpartum. Pregnancy was complicated by h/o CS x1 with term demise, MDD, recent immigration status. CS complicated by extension to the cervix with EBL 1700 and intra-op hgb of 12.    Routine postpartum:  Heme:Hgb 13.3>EBL 1700 (TXA x2, mthg) > 12.9 (intra-op)> AM hgb pending. No symptoms of ABLA. Will discharge home w/ PO iron if Hgb under 10.  Pain: well-controlled with tylenol,and oxycodone prn, continue. Ibuprofen held d/t acute blood loss, will re-start today pending AM hgb. Lidocaine patch added this morning.   Rh positive/Rubella immune. No intevrention   Feeding: Breast  :  manzanares remains in place, remove  this morning and await spontaneous void   PPX: Encourage ambulation  Continue regular diet, scheduled bowel regimen, prn antiemetics  Contraception: not discussed     Dispo: Anticipate discharge home PPD#2-3    Siobhan Roper MD  OB/GYN PGY-2  8/10/2023 6:53 AM     Appreciate note by Dr. Roper. Patient has been seen and examined by me separate from the resident, agree with above note. Doing well for POD1, CBC stable, toradol added today.     Yissel Morrison MD  9:34 AM

## 2023-08-10 NOTE — OP NOTE
[facility]  Full Operative Progress Note     Surgery Date:  2023    Surgeon: Nadia Clark MD    Assistants:    - Azalea Qiu MD - Assisting Staff  - Tammy Savage DO, MS PGY-3  - Beatriz Espinoza MD PGY-4  - Sharon Mims MD PGY-2    Pre-op Diagnosis:    - Intrauterine pregnancy at 40w0d  - Hx Term IUFD  - Hx CS x1  - Maternal exhaustion in labor     Post-op Diagnosis:    - Same, delivered  - Failed TOLAC  - Vigorous male infant   - Postpartum hemorrhage    Procedure: Repeat low-transverse  section with double layer uterine closure via pfannenstiel incision    Anesthesia: Spinal  EBL: 1712 ml   IVF: 1000 mL crystalloid  UOP: 200 mL clear urine at the end of the case  Drains: Sher Catheter   Specimens:   ID Type Source Tests Collected by Time Destination   A :  Tissue Umbilical Cord SEE PROVIDERS ORDERS Nadia Clark MD 2023  6:00 PM    B :  Cord blood Umbilical Cord OR DOCUMENTATION ONLY Nadia Clark MD 2023  6:00 PM    C :  Cord blood, arterial Umbilical Cord OR DOCUMENTATION ONLY Nadia Clark MD 2023  6:01 PM    D :  Cord blood, venous Umbilical Cord OR DOCUMENTATION ONLY Nadia Clark MD 2023  6:01 PM    E :  Placenta Placenta SEE PROVIDERS ORDERS Nadia Clark MD 2023  6:01 PM    Additional Medications: 2g Ancef, 500mg Azithromycin, 1g TXA (re-dosed), 250mg Methergine  Complications: Hysterotomy extension through cervix, postpartum hemorrhage secondary to uterine atony and extensive uterine repair.     Indications:   Yusra Gonzales is a 33 year old yo  at 40w0d who was admitted on 23 as primary patient of the CN[facility] service for scheduled IOL secondary to history of term IUFD with last pregnancy. Patient has a history of CS x1 and desired TOLAC on admission.Labor course was notable for augmentation with pitocin, AROM performed at appx 4.5cm with bloody fluid. Patient quickly progressed to complete  approximately 1 hour after AROM was performed and has been pushing for approximately 45mins. OBGYN team was consulted by [facility] service for evaluation of labor evaluation/maternal request. On my entry into the room patient pushing with contractions. SVE 10/100/+1, pushing to +2. MARY. Patient stating she is tired and requesting to move to a  section. Following extensive counseling including risks of  section at this stage, patient desired to proceed to OR. Using professional Polish speaking , discussed risks, benefits, and alternatives of  section were discussed, including the risks of bleeding, infection, injury to surrounding organs, fetal injury, and remote risks of hysterectomy. She had time to ask questions, and again confirmed her desire to proceed. Verbal consent was provided for  section and blood transfusion in the setting of hemorrhage or acute blood loss anemia was provided.     Findings:   Significant midline extension from hysterotomy approximately 7 cm extending through the cervix. Repaired. Floseal placed over extension and hysterotomy. Hemostatic prior to fascial closure.  Moderate rectofascial adhesions. Minimal intraabdominal adhesions.  Scant amount of clear amniotic fluid  Vigorous male infant in vertex presentation. Apgars 8 at 1 minute & 9 at 5 minutes. Weight 3.27 kg (7 lb 3.3 oz).  Grossly normal appearing  uterus, fallopian tubes, and ovaries.   Cord Gases   Latest Reference Range & Units 23 17:45   Ph Cord Arterial 7.16 - 7.39  7.26   PCO2 Cord Arterial 35 - 71 mm Hg 55   PO2 Cord Arterial 3 - 33 mm Hg 12   Bicarbonate Cord Arterial 16 - 24 mmol/L 25 (H)   Base Excess Cord Arterial -9.6 - 2.0 mmol/L -3.2   Ph Cord Blood Venous 7.21 - 7.45  7.27   PCO2 Cord Venous 27 - 57 mm Hg 53   PO2 Cord Venous 21 - 37 mm Hg 26   Bicarbonate Cord Venous 16 - 24 mmol/L 25 (H)   Base Excess/Deficit (+/-) -8.1 - 1.9 mmol/L -3.0   (H): Data is abnormally  high    Due to significant hysterotomy extension into cervix, would recommend serial cervical length measurements, and delivery via  at 38 weeks in any future pregnancies. Patient should not labor.     Procedure Details:   The patient was brought to the OR in an urgent fashion where she underwent general anesthesia. She was placed in the dorsal supine position with a slight leftward tilt. She was prepped and draped in the usual sterile fashion. A abbreviated surgical time out was performed. A pfannenstiel skin incision was made with the scalpel, and carried down to the underlying fascia with sharp and blunt dissection. The fascia was incised in the midline, and the incision was extended laterally bluntly. The rectus muscles were  and the peritoneum was entered bluntly, and the opening was extended with digital pressure The bladder blade was placed. A transverse hysterotomy was made with the scalpel in the lower uterine segment, and the incision was extended with digital pressure.     The infant was noted to be deeply impacted in the vertex position. Initial attempt to delivery fetal head was unsuccessful due to deep impaction of fetal head. Assistance was requested for vaginal hand to assist with delivery. Scrubbed physician provided vaginal hand, and infant was subsequently delivered successfully atraumatically. , and was delivered atraumatically. The shoulders delivered easily. No nuchal cord was noted. The cord was doubly clamped and cut immediately following delivery and the infant was handed off to the awaiting NICU staff. A segment of cord was cut and collected. The placenta was delivered with gentle traction on the umbilical cord and uterine massage. The uterus was exteriorized and cleared of all clots and debris. Uterine tone was noted to be atonic with pitocin given through the running IV and uterine massage. Request was made for patient to be administered methergine. Hysterotomy was  examined and large extension from midline portion of hysterotomy extending through cervix was identified. Majority of hysterotomy was first closed using running locked 0-Vicryl. Extension was then closed using running locked 0-Vicryl. Remainder of main portion of hysterotomy was then closed using 0-Vicyrl. Due to ongoing bleeding of extension, this area was imbricated with simple running stitch using 0 Monocryl. On examination hemostasis had not fully been achieved so decision was made to apply Jovan-seal to this area in standard fashion. Following application of Jovan-seal, final inspection of hysterotomy and extension was notable for good hemostasis.      The posterior cul-de-sac was cleared of all clots and debris. The uterus was returned to the abdomen. The pericolic gutters were cleared of all clots and debris. The hysterotomy was reexamined and noted to be hemostatic. The fascia and rectus muscles were examined and areas of oozing were controlled with electrocautery. The fascia was closed with a running 0 Vicryl suture. The subcutaneous tissue was irrigated and areas of oozing were controlled with electrocautery. The subcutaneous tissue was less than 2 cm in thickness. The skin was closed with 4-0 Monocryl and covered with steri strips and overlying sterile dressing.    All sponge, needle, and instrument counts were correct. The patient tolerated the procedure well, and was transferred to recovery in stable condition.     Dr. Clark was present and scrubbed for the procedure.     Tammy Savage DO, MS  Obstetrics, Gynecology & Women's Health   Resident, PGY-3  08/09/2023 7:26 PM    Staff MD Note  I was present and scrubbed for the entire procedure noted above.  I agree with the description above and any necessary changes have been made by me.  The assistance of Dr. Qiu was required in this case due to STAT nature of procedure and complicated nature of procedure secondary to low position of fetal head in  second stage. Dr. Qiu assisted by providing assistance with retraction during entry, elevation of fetal head vaginally to allow for safe delivery of infant with impacted head secondary to low station and assistance with closure of hysterotomy and extension.   Nadia Clark MD

## 2023-08-10 NOTE — ADDENDUM NOTE
Addendum  created 08/09/23 2137 by Jessica Alexander MD    Delete clinical note, Intraprocedure Blocks edited, Order Canceled from Note

## 2023-08-10 NOTE — ANESTHESIA CARE TRANSFER NOTE
Patient: Yusra Gonzales    Procedure: Procedure(s):   section       Diagnosis:  delivery w/o mention of indication, luis anne [O82]  Diagnosis Additional Information: No value filed.    Anesthesia Type:   No value filed.     Note:    Oropharynx: oropharynx clear of all foreign objects and spontaneously breathing  Level of Consciousness: awake  Oxygen Supplementation: face mask  Level of Supplemental Oxygen (L/min / FiO2): 6  Independent Airway: airway patency satisfactory and stable  Dentition: dentition unchanged  Vital Signs Stable: post-procedure vital signs reviewed and stable  Report to RN Given: handoff report given  Patient transferred to: PACU    Handoff Report: Identifed the Patient, Identified the Reponsible Provider, Reviewed the pertinent medical history, Discussed the surgical course, Reviewed Intra-OP anesthesia mangement and issues during anesthesia, Set expectations for post-procedure period and Allowed opportunity for questions and acknowledgement of understanding      Vitals:  Vitals Value Taken Time   /96 23 1922   Temp     Pulse 98 23 1924   Resp     SpO2 98 % 23   Vitals shown include unvalidated device data.    Electronically Signed By: Jessica Alexander MD  2023  7:25 PM

## 2023-08-10 NOTE — PLAN OF CARE
Goal Outcome Evaluation:      Plan of Care Reviewed With: patient, spouse    Overall Patient Progress: improvingOverall Patient Progress: improving    VSS. Fundus midline, firm, and U/1. light lochia. Incisional dressing is C/D/I. Severe pain (10/10) with ambulation/ activity OOB, given scheduled, PRN IV and PO meds. Ambulating with stand by assist. tolerating regular diet, and voiding without difficulty post manzanares removal. Breastfeeding with full assistance. Bonding well with . Continue with plan of care.

## 2023-08-11 PROCEDURE — 250N000013 HC RX MED GY IP 250 OP 250 PS 637: Performed by: STUDENT IN AN ORGANIZED HEALTH CARE EDUCATION/TRAINING PROGRAM

## 2023-08-11 PROCEDURE — 250N000013 HC RX MED GY IP 250 OP 250 PS 637

## 2023-08-11 PROCEDURE — 120N000002 HC R&B MED SURG/OB UMMC

## 2023-08-11 RX ADMIN — LIDOCAINE PATCH 4% 2 PATCH: 40 PATCH TOPICAL at 08:59

## 2023-08-11 RX ADMIN — ACETAMINOPHEN 975 MG: 325 TABLET, FILM COATED ORAL at 12:08

## 2023-08-11 RX ADMIN — ACETAMINOPHEN 975 MG: 325 TABLET, FILM COATED ORAL at 18:00

## 2023-08-11 RX ADMIN — ACETAMINOPHEN 975 MG: 325 TABLET, FILM COATED ORAL at 06:03

## 2023-08-11 ASSESSMENT — ACTIVITIES OF DAILY LIVING (ADL)
ADLS_ACUITY_SCORE: 18

## 2023-08-11 NOTE — LACTATION NOTE
This note was copied from a baby's chart.  Follow Up Consult    Infant Name: undecided    Infant's Primary Care Clinic: undecided but thinking [facility] as it is close to home    Maternal Assessment: Yusra denies any discomfort with latching.    Infant Assessment:  Baby Boy has age appropriate output and weight loss.      Weight Change Since Birth: -4% at 2 day old      Feeding History: Yusra shares that Baby Boy had cluster fed overnight and is now sleepy this morning. We discussed how this feeding pattern can be normal the first few weeks and that night feedings are important. Reviewed ways to stimulate baby while at the breast to continue actively feeding and continue frequent feedings throughout the day to help baby adjust to parents schedule.    Feeding Assessment: No feeding assessment done at this visit.     Education:   - Expected  feeding patterns in the first few days (pg. 38 of Your Guide to To Postpartum and  Care)/ the Second Night  - Stages of milk production  - Benefits of feeding on cue  - Benefits of skin to skin  - Gentle breast compressions as needed to enhance milk transfer  - How to tell when baby is finished  - How to tell if baby is getting enough  - Expected  output  -  weight loss  - Infant Feeding Log  - Signs breastfeeding is going well (comfortable latch, audible swallows, age appropriate output and weight loss)    - Pumping recommendations (based on patient need)  - Inpatient breastfeeding support  - Outpatient lactation resources    Handouts: Infant Feeding Log (Week 1, Your Guide to Postpartum & [place] Care Book) and [facility] Lactation Resources    Plan: Continue breastfeeding on cue with RN support as needed with a goal of 8-12 feedings per day. Encourage frequent skin to skin, breast massage and hand expression.     Encouraged follow up with outpatient lactation consultant  as needed after discharge. Family plans to follow  up with [facility] and is aware of lactation support there.    Yusra has questions about using a breast pump. Discussed that she does not need to begin pumping at this time if baby is breastfeeding well, but she could begin to add one pumping session per day after first morning feed to start to save extra milk when preparing to return to work. She will let the lactation team know when she has decided which pump she would like to take home and will call at that time for demo.       Gloria Huitron RN, [facility]   Lactation Consultant  Ascom: *[phone]  Office: [phone]

## 2023-08-11 NOTE — PROGRESS NOTES
This patient was the source patient in a [employer] exposure to an employee.  She had a rapid HIV test that resulted positive today, 8/10/23.  Per [facility] Policy, I reached out to the Primary care provider for this patient,(Dr. Morrison), as well as the baby's Provider, (Dr. Bourgeois).    It is recommended that this mother not breastfeed until further HIV testing results (there is a pending HIV Ag/Ab test ) and until an Infectious Disease MD is consulted.    .8/10/2023  .Dolores Benites, Infection Prevention    Update:  The HIV Ag/Ab combo test is negative for this patient.  I recommend ed to Dr. Morrison that an ID MD review the lab results and confirm that the baby is able to breastfeed prior to initiating breastfeeding.

## 2023-08-11 NOTE — PLAN OF CARE
Goal Outcome Evaluation:         Data: VSS, postpartum assessments WNL. Pt is voiding without difficulty a, up ad penelope, passing gas and has a bowel movement, eating and drinking normally. Incision dressing is clean dry, lochia is scant, no s/s infection. Breast feeding well. Taking tylenol  for pain and refuse oxycodone.Education provided on Pt is agreeable with her plan of care. Positive attachment behaviors observed with infant. Support person present. Will continue with plan of care.

## 2023-08-11 NOTE — PLAN OF CARE
VSS and postpartum assessments WDL. Showered, dressing removed, IV out. Up ad penelope with steady gait and independent with cares.  Bonding well with infant.  Breastfeeding on cue independently.  Pain managed with tylenol, oxycodone and lidocaine patches. Lidocaine patches fell off in shower at 1430 this afternoon.  FOB present and supportive.  Will continue with postpartum cares and education per plan of care.

## 2023-08-11 NOTE — PROGRESS NOTES
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 She had rapid HIV test that resulted positive today. Confirmatory test was negative. Peds team reviewed with ID MD and confirmed mom may breast feed and was false positive test. Please see note by Dr. Cotto in baby chart.     Yissel Morrison MD

## 2023-08-11 NOTE — PROVIDER NOTIFICATION
08/10/23 1917   Provider Notification   Provider Name/Title Tamiko   Method of Notification Electronic Page   Request Evaluate in Person   Notification Reason Lab Results     lab stated it is positive and we need to send HIV antigen antibody combo cascade. please call charge to discuss thanks

## 2023-08-11 NOTE — PROGRESS NOTES
SW ensured car seat from [employer] was received by patient. SW delivered diapers and pack and play from [facility] so baby has a safe place to sleep.     SW received confirmation from [facility] that referral has been received and a nurse will be able to visit the family. They also will attempt to set up longer term visiting/support through [facility].    Patient denies any other needs at this time. SW can be consulted if further needs arise.    LENORA Hester [facility]  Casual   On call pager number: [phone]

## 2023-08-11 NOTE — PROGRESS NOTES
Obstetrics Postpartum Progress Note  DOS: 08/10/23  MRN: [id number]    POD#2 after RLTCS     S: Patient states she is doing well.  Pain is well controlled on current medications. Lochia much less than menses.  Eating and drinking without nausea/vomiting.  After her bowel medications she is passing flatus and has had a bowel movement. Ambulating without lightheadedness or dizziness. Voiding spontaneously.  Denies headaches, vision changes, chest pain, SOB.  Breastfeeding. No other acute concerns.   O:  Vitals:    08/10/23 1344 08/10/23 1411 08/10/23 2110 23 0020   BP:  94/58 93/59 90/53   BP Location:  Right arm Right arm Right arm   Patient Position:  Semi-Mccormack's Semi-Mccormack's Semi-Mccormack's   Cuff Size:  Adult Regular Adult Regular Adult Regular   Pulse:  82 93 92   Resp:   18 18   Temp: 98  F (36.7  C)  98.9  F (37.2  C) 98.7  F (37.1  C)   TempSrc: Oral  Oral Oral   SpO2: 96% 97%       Gen:  NAD  CV: Regular rate, well perfused  Resp: Nonlabored on room air, normal inspiratory effort  Abd: soft, nondistended, nontender, fundus firm at 2 cm below umbilicus  Incision:  clean, dry, intact, dressing overlying     Labs:  Hemoglobin   Date Value Ref Range Status   08/10/2023 10.7 (L) 11.7 - 15.7 g/dL Final   08/10/2023 11.5 (L) 11.7 - 15.7 g/dL Final       No results found for: ALEJANDRINA    Assessment and Plan: 33 year old  POD#2 s/p STAT RLTCS, doing well postpartum. Pregnancy was complicated by h/o CS x1 with term demise, MDD, recent immigration status. CS complicated by extension to the cervix with EBL 1700 and intra-op hgb of 12.    Routine postpartum:  Heme:Hgb 13.3>EBL 1700 (TXA x2, mthg) > 12.9 (intra-op)> 11.5. No symptoms of ABLA.   Pain: well-controlled with tylenol, ibuprofen, and oxycodone prn as well as lidocaine patches.   Rh positive/Rubella immune. No intervention   Feeding: Breast  :  s/p manzanares, voiding spontaneously    PPX: Encourage ambulation  Continue regular diet, scheduled bowel  regimen, prn antiemetics  Contraception: Not discussed    Dispo: Anticipate discharge home PPD#2-3    Siobhan Roper MD  OB/GYN PGY-2  8/11/2023 6:58 AM     I personally examined and evaluated Yusra Gonzales on 8/11/2023.  I discussed the patient with Dr. Roper and agree with the presentation, exam and plan of care documented in this note with edits by me. Yusra is recovering well but still working on pain control. Recommend staying until tomorrow for pain control. Reviewed pain control options.   Deanne Vigil MD

## 2023-08-12 VITALS
HEART RATE: 95 BPM | TEMPERATURE: 98.1 F | SYSTOLIC BLOOD PRESSURE: 107 MMHG | RESPIRATION RATE: 16 BRPM | BODY MASS INDEX: 21.92 KG/M2 | WEIGHT: 144.62 LBS | OXYGEN SATURATION: 97 % | DIASTOLIC BLOOD PRESSURE: 74 MMHG

## 2023-08-12 LAB — HCV RNA SERPL NAA+PROBE-ACNC: NOT DETECTED IU/ML

## 2023-08-12 PROCEDURE — 250N000013 HC RX MED GY IP 250 OP 250 PS 637: Performed by: STUDENT IN AN ORGANIZED HEALTH CARE EDUCATION/TRAINING PROGRAM

## 2023-08-12 RX ORDER — ACETAMINOPHEN 325 MG/1
650 TABLET ORAL EVERY 6 HOURS
Qty: 60 TABLET | Refills: 0 | Status: SHIPPED | OUTPATIENT
Start: 2023-08-12

## 2023-08-12 RX ORDER — DOCUSATE SODIUM 100 MG/1
100 CAPSULE, LIQUID FILLED ORAL 2 TIMES DAILY
Qty: 60 CAPSULE | Refills: 0 | Status: SHIPPED | OUTPATIENT
Start: 2023-08-12

## 2023-08-12 RX ORDER — OXYCODONE HYDROCHLORIDE 5 MG/1
5 TABLET ORAL EVERY 6 HOURS PRN
Qty: 6 TABLET | Refills: 0 | Status: SHIPPED | OUTPATIENT
Start: 2023-08-12 | End: 2023-08-15

## 2023-08-12 RX ORDER — IBUPROFEN 600 MG/1
600 TABLET, FILM COATED ORAL EVERY 6 HOURS PRN
Qty: 60 TABLET | Refills: 0 | Status: SHIPPED | OUTPATIENT
Start: 2023-08-12

## 2023-08-12 RX ADMIN — ACETAMINOPHEN 975 MG: 325 TABLET, FILM COATED ORAL at 01:50

## 2023-08-12 RX ADMIN — ACETAMINOPHEN 975 MG: 325 TABLET, FILM COATED ORAL at 09:24

## 2023-08-12 ASSESSMENT — ACTIVITIES OF DAILY LIVING (ADL)
ADLS_ACUITY_SCORE: 18

## 2023-08-12 NOTE — PLAN OF CARE
Goal Outcome Evaluation:    Vital signs stable. Postpartum assessment WDL.. Pain controlled with tylenol . Patient ambulating slowly and independently. Voiding frequently. Patient passing gas and had a bowel movement. breast feeding and formula feeding.   Pt is agreeable with her plan of care. Positive attachment behaviors observed with infant. Support person present. Will continue with plan of care.    Goal Outcome Evaluation:       Plan of Care Reviewed With: patient  Overall Patient Progress: improving

## 2023-08-12 NOTE — LACTATION NOTE
This note was copied from a baby's chart.  Follow Up Consult    Infant Name:     Infant's Primary Care Clinic: [facility]    Maternal Assessment: Slight discomfort on nipples with latching, using lanolin      Infant Assessment:  Baby has age appropriate output and weight loss.      Weight Change Since Birth: -8% at 3 day old      Feeding History: Feeding at breast but also getting some formula after nursing.  Yusra is concerned about supply and weight loss.      Feeding Assessment: Not visualized during visit, baby is sleeping.     Education:   - Stages of milk production  - Benefits of hand expression of colostrum  - Early feeding cues     - Benefits of feeding on cue  - Benefits of skin to skin  - Tips to get and maintain a deep latch  - Nutritive vs.non-nutritive sucking  - Gentle breast compressions as needed to enhance milk transfer  - How to tell if baby is getting enough  - Expected  output  - [place] weight loss  - [employer] [facility] Breastfeeding/Pumping videos  - Signs breastfeeding is going well (comfortable latch, audible swallows, age appropriate output and weight loss)    - Tips to prevent engorgement  - Signs of engorgement  - Tips to manage engorgement  - Pumping recommendations (based on patient need)  - [facility] breast pump part/infant feeding supplies cleaning recommendations  - Outpatient lactation resources    Pt was given a pump to take home and was shown how to use it.  She had good questions about how to know when to pump and how to store milk.    Handouts: How to Keep Your Breast Pump Kit Clean    Plan: Follow up with pediatrician as recommended.  Breastfeed every 2-3 hours and ensure deep latch.      Encouraged follow up with outpatient lactation consultant  as needed after discharge. Family plans to follow up with [facility].       Jacy Donovan RN, IBCLC   Lactation Consultant  Ascom: *[phone]  Office: [phone]

## 2023-08-12 NOTE — PLAN OF CARE
All postpartum assessments/vitals WDL. All concerns addressed before discharge. Reviewed discharge medications. Reviewed follow-up for appointment in 6 weeks.  Reviewed discharge instructions and answered all questions.  Discharged home with infant and all belongings at 1428.

## 2023-08-12 NOTE — PROGRESS NOTES
Obstetrics Postpartum Progress Note  DOS: 23  MRN: [id number]    POD#3 after RLTCS     S: Patient states she is doing well.  Pain is well controlled on current medications. Lochia much less than menses.  Eating and drinking without nausea/vomiting.  After her bowel medications she is passing flatus and has had a bowel movement. Ambulating without lightheadedness or dizziness. Voiding spontaneously.  Denies headaches, vision changes, chest pain, SOB.  Breastfeeding. No other acute concerns.   O:  Vitals:    23 0020 23 0805 23 1800 23 0050   BP: 90/53 98/60 111/65 97/56   BP Location: Right arm Right arm Right arm Right arm   Patient Position: Semi-Mccormack's Semi-Mccormack's Semi-Mccormack's Semi-Mccormack's   Cuff Size: Adult Regular Adult Regular Adult Regular Adult Regular   Pulse: 92 87 86 78   Resp: 18 16 16 16   Temp: 98.7  F (37.1  C) 98.1  F (36.7  C) 98.5  F (36.9  C) 98.4  F (36.9  C)   TempSrc: Oral Oral Oral Oral   SpO2:         Gen:  NAD  CV: Regular rate, well perfused  Resp: Nonlabored on room air, normal inspiratory effort  Abd: soft, nondistended, nontender, fundus firm at 2 cm below umbilicus  Incision:  clean, dry, intact, dressing overlying     Labs:  Hemoglobin   Date Value Ref Range Status   08/10/2023 10.7 (L) 11.7 - 15.7 g/dL Final   08/10/2023 11.5 (L) 11.7 - 15.7 g/dL Final       No results found for: RUQIGG    Assessment and Plan: 33 year old  POD#3 s/p STAT RLTCS under GETA, doing well postpartum. Pregnancy was complicated by h/o CS x1 with term demise, MDD, recent immigration status. CS complicated by extension to the cervix with EBL 1700 and intra-op hgb of 12.    # Routine postpartum:  Heme:Hgb 13.3>EBL 1700 (TXA x2, mthg) > 12.9 (intra-op)> 11.5. No symptoms of ABLA.   Pain: well-controlled with tylenol, ibuprofen, and oxycodone prn as well as lidocaine patches.   Rh positive/Rubella immune. No intervention   Feeding: Breast  :  s/p manzanares, voiding  spontaneously    PPX: Encourage ambulation  Continue regular diet, scheduled bowel regimen, prn antiemetics  Contraception: Declined. Reviewed recommendations for pregnancy spacing for approximately 18 months     # MDD  # PTSD  # H/o Term IUFD  - No meds   - Mood stable     Dispo: Anticipate discharge home PPD#2-3    Alyssa Caldera MD, MPH, MS (PGY1)   Obstetrics, Gynecology & Women's Health   Resident, PGY-1  08/12/2023 8:19 AM    I personally examined and evaluated Yusra Gonzales on 8/12/2023.  I discussed the patient with Dr. Caldera and agree with the presentation, exam and plan of care documented in this note with edits by me.   Lena Martinez MD MPH

## 2023-08-12 NOTE — DISCHARGE INSTRUCTIONS
Warning Signs after Having a Baby    Keep this paper on your fridge or somewhere else where you can see it.    Call your provider if you have any of these symptoms up to 12 weeks after having your baby.    Thoughts of hurting yourself or your baby  Pain in your chest or trouble breathing  Severe headache not helped by pain medicine  Eyesight concerns (blurry vision, seeing spots or flashes of light, other changes to eyesight)  Fainting, shaking or other signs of a seizure    Call 9-1-1 if you feel that it is an emergency.     The symptoms below can happen to anyone after giving birth. They can be very serious. Call your provider if you have any of these warning signs.    My provider s phone number: _______________________    Losing too much blood (hemorrhage)    Call your provider if you soak through a pad in less than an hour or pass blood clots bigger than a golf ball. These may be signs that you are bleeding too much.    Blood clots in the legs or lungs    After you give birth, your body naturally clots its blood to help prevent blood loss. Sometimes this increased clotting can happen in other areas of the body, like the legs or lungs. This can block your blood flow and be very dangerous.     Call your provider if you:  Have a red, swollen spot on the back of your leg that is warm or painful when you touch it.   Are coughing up blood.     Infection    Call your provider if you have any of these symptoms:  Fever of 100.4 F (38 C) or higher.  Pain or redness around your stitches if you had an incision.   Any yellow, white, or green fluid coming from places where you had stitches or surgery.    Mood Problems (postpartum depression)    Many people feel sad or have mood changes after having a baby. But for some people, these mood swings are worse.     Call your provider right away if you feel so anxious or nervous that you can't care for yourself or your baby.    Preeclampsia (high blood pressure)    Even if you  didn't have high blood pressure when you were pregnant, you are at risk for the high blood pressure disease called preeclampsia. This risk can last up to 12 weeks after giving birth.     Call your provider if you have:   Pain on your right side under your rib cage  Sudden swelling in the hands and face    Remember: You know your body. If something doesn't feel right, get medical help.     For informational purposes only. Not to replace the advice of your health care provider. Copyright 2020 [facility]. All rights reserved. Clinically reviewed by Nevaeh Cantrell, RNC-OB, MSN. [employer] 599465 - Rev .    Postop  Birth Instructions    Activity     Do not lift more than 10 pounds for 6 weeks after surgery.  Ask family and friends for help when you need it.  No driving until you have stopped taking your pain medications (usually two weeks after surgery).  No heavy exercise or activity for 6 weeks.  Don't do anything that will put a strain on your surgery site.  Don't strain when using the toilet.  Your care team may prescribe a stool softener if you have problems with your bowel movements.     To care for your incision:     Keep the incision clean and dry.  Do not soak your incision in water. No swimming or hot tubs until it has fully healed. You may soak in the bathtub if the water level is below your incision.  Do not use peroxide, gel, cream, lotion, or ointment on your incision.  Adjust your clothes to avoid pressure on your surgery site (check the elastic in your underwear for example).     You may see a small amount of clear or pink drainage and this is normal.  Check with your health care provider:     If the drainage increases or has an odor.  If the incision reddens, you have swelling, or develop a rash.  If you have increased pain and the medicine we prescribed doesn't help.  If you have a fever above 100.4 F (38 C) with or without chills when placing thermometer under your tongue.    The area around your incision (surgery wound), will feel numb.  This is normal. The numbness should go away in less than a year.     Keep your hands clean:  Always wash your hands before touching your incision (surgery wound). This helps reduce your risk of infection. If your hands aren't dirty, you may use an alcohol hand-rub to clean your hands. Keep your nails clean and short.    Call your healthcare provider if you have any of these symptoms:     You soak a sanitary pad with blood within 1 hour, or you see blood clots larger than a golf ball.  Bleeding that lasts more than 6 weeks.  Vaginal discharge that smells bad.  Severe pain, cramping or tenderness in your lower belly area.  A need to urinate more frequently (use the toilet more often), more urgently (use the toilet very quickly), or it burns when you urinate.  Nausea and vomiting.  Redness, swelling or pain around a vein in your leg.  Problems breastfeeding or a red or painful area on your breast.  Chest pain and cough or are gasping for air.  Problems with coping with sadness, anxiety or depression. If you have concerns about hurting yourself or the baby, call your provider immediately.    You have questions or concerns after you return home.

## 2023-08-21 NOTE — PROGRESS NOTES
"Postpartum Visit Note    Seen with an Thai     S:  Yusra Gonzales is a 33 year old  here for her 2-week incision/mood checkup.     Delivery Date:   Delivering provider:  Nadia Clark MD.    Type of delivery:  RLTCS after maternal request in labor, complicated by cervical extension.     Infant gender:  boy, weight 7 pounds 3 oz.  Feeding Method:   and Bottlefed with formula Feeling ok with current regimen.   Bleeding:  Light.   Bowel/Urinary problems:  No  No longer needing pain medications   Waking up to nurse baby at night, feeling like she is able to get enough sleep.   Mood: feeling weepy at times, overall states mood is ok and declines therapy/meds. Has FOB available for help. No other family or support in the area.  Planning to stay home for 3 months postpartum then go to work. Set up with WIC. Does not yet have childcare.   Having some itching around her incision but no erythema or discharge  Restorationism restrictions on contraceptions: no medication contraception or condoms  Notes some numbness over lower abdomen   Numbness over lateral 3 fingers on left hand, worse when holding baby to breastfeed.     O:  EXAM:  /71   Pulse 76   Ht 1.73 m (5' 8.11\")   Wt 63.8 kg (140 lb 9.6 oz)   LMP 2022 (Approximate)   Breastfeeding Yes   BMI 21.31 kg/m    General: alert, oriented, sitting comfortably  Psych: normal mood and affect  Abdomen: soft, non-tender, fundus firm approximately 6cm below umbilicus  Incision: well healed, steri strips removed, no erythema or discharge  Ext: not able to precipitate numbness with tapping of nerve sheath or wrist flexion     A:   33 year old  POD#13 s/p repeat CS following failed TOLAC complicated by hysterotomy extension through cervix and PPH.     P:    Postpartum Care  - Contraception: discussed lactational amenorrhea and requirements and NFP once menses returns. Recommended at least a year in between delivery and trying for next " pregnancy.   - Feeding: breast and formula   - Continue PNV  - follow-up in 4 weeks for postpartum visit   - discussed need for cervical evaluation and repeat CS at 38 weeks with any future pregnancies   - reviewed mood changes vs postpartum depression and when to contact us  - discussed incision cares    Hepatitis B NI  - s/p 3 injections (2 in 2022 and 1 in 2023), consider reassessing for immunity status at next visit     Left finger numbness  - reviewed potential causes (most likely positional with breastfeeding) but if not improving with propping of baby referral to ortho placed     Deanne Vigil MD

## 2023-08-22 ENCOUNTER — TELEPHONE (OUTPATIENT)
Dept: FAMILY MEDICINE | Facility: CLINIC | Age: 33
End: 2023-08-22
Payer: COMMERCIAL

## 2023-08-22 ENCOUNTER — PRENATAL OFFICE VISIT (OUTPATIENT)
Dept: OBGYN | Facility: CLINIC | Age: 33
End: 2023-08-22
Payer: COMMERCIAL

## 2023-08-22 VITALS
HEIGHT: 68 IN | DIASTOLIC BLOOD PRESSURE: 71 MMHG | BODY MASS INDEX: 21.31 KG/M2 | SYSTOLIC BLOOD PRESSURE: 112 MMHG | HEART RATE: 76 BPM | WEIGHT: 140.6 LBS

## 2023-08-22 DIAGNOSIS — Z98.891 S/P CESAREAN SECTION: ICD-10-CM

## 2023-08-22 DIAGNOSIS — Z87.59 HISTORY OF STILLBIRTH: ICD-10-CM

## 2023-08-22 DIAGNOSIS — R20.0 FINGER NUMBNESS: ICD-10-CM

## 2023-08-22 DIAGNOSIS — Z65.8 INADEQUATE SOCIAL SUPPORT: ICD-10-CM

## 2023-08-22 PROCEDURE — 99207 PR POST PARTUM EXAM: CPT | Performed by: STUDENT IN AN ORGANIZED HEALTH CARE EDUCATION/TRAINING PROGRAM

## 2023-08-22 PROCEDURE — G0463 HOSPITAL OUTPT CLINIC VISIT: HCPCS

## 2023-08-22 NOTE — TELEPHONE ENCOUNTER
[facility] used to connect w Pt regarding circumcision time and date.  Pt desired earlier apt-schedules booked & rev'd. Asked to be put on a waitlist.  Blanca'd for 9/8 @ 0920am w Dr. Ramos.  Rhoda WEI, ELISABETH

## 2023-08-22 NOTE — TELEPHONE ENCOUNTER
[facility] used-Called and spoke with family member: Yusra-mother   Call regarding infant's NB apts @ [facility] and questions about circumcision scheduling.     Family Concerns: circumcision scheduling-advised calling insurance for possible coverage and rev'd the $500 cost if not covered. Mother would like to schedule him asap.    Will call Pt back w verification regarding apts.  Rhoda WEI, BECCA

## 2023-08-22 NOTE — PATIENT INSTRUCTIONS
Thank you for trusting us with your care!     If you need to contact us for questions about:  Symptoms, Scheduling & Medical Questions; Non-urgent (2-3 day response) Erick message, Urgent (needing response today) [phone] (if after 3:30pm next day response)   Prescriptions: Please call your Pharmacy   Billing: [facility] [phone] or [facility]:[phone]

## 2023-09-20 ENCOUNTER — OFFICE VISIT (OUTPATIENT)
Dept: OBGYN | Facility: CLINIC | Age: 33
End: 2023-09-20
Payer: COMMERCIAL

## 2023-09-20 VITALS
BODY MASS INDEX: 21.05 KG/M2 | SYSTOLIC BLOOD PRESSURE: 93 MMHG | WEIGHT: 138.9 LBS | HEART RATE: 76 BPM | DIASTOLIC BLOOD PRESSURE: 62 MMHG | HEIGHT: 68 IN

## 2023-09-20 PROCEDURE — 99207 PR POST PARTUM EXAM: CPT | Mod: GE | Performed by: OBSTETRICS & GYNECOLOGY

## 2023-09-20 PROCEDURE — G0463 HOSPITAL OUTPT CLINIC VISIT: HCPCS | Performed by: STUDENT IN AN ORGANIZED HEALTH CARE EDUCATION/TRAINING PROGRAM

## 2023-09-20 NOTE — NURSING NOTE
SUBJECTIVE:   Yusra Gonzales is here for her 6-week postpartum checkup.     PHQ-9 score:   Hx of Abuse:  No    Delivery Date: .    Delivering provider:  Nadia Clark MD.    Type of delivery:  .     Delivery complications: None  Infant gender:  boy, weight 7 pounds 3 oz.  Feeding Method:   and Bottlefed.  Complications reported with feeding:  none, infant thriving .    Bleeding:  Light.  Duration:  a couple days.  Menses resumed:  Yes   Bowel/Urinary problems:  Yes, a couple days    Contraception Planned:  None -- is she planning pregnancy? Yes, not now  She  has not had intercourse since delivery..

## 2023-09-20 NOTE — PROGRESS NOTES
"6 Week Postpartum Visit Note    S:  Yusra Gonzales is a 33 year old  here for her 6-week postpartum checkup. She reports she is overall doing well. She has no concerns today. She is not having any pain, HA, vision changes, CP, SOB, N/V. Here with her baby.     Delivery Date: 23.    Delivering provider:  Nadia Clark MD.    Type of delivery:  RLTCS after maternal request in labor, complicated by cervical extension. Plan serial CL and delivery via CS in future CS  Infant gender:  boy, weight 7 pounds 3 oz.  Feeding Method:   and Bottlefed with formula Feeling ok with current regimen.   Bleeding:  stopped, period resumed.   Bowel/Urinary problems:  No    ================================================================  ROS: 10 point ROS neg other than the symptoms noted above in the HPI.     O:  EXAM:  BP 93/62   Pulse 76   Ht 1.73 m (5' 8.11\")   Wt 63 kg (138 lb 14.4 oz)   LMP 2023 (Approximate)   Breastfeeding Yes   BMI 21.05 kg/m    General: alert, oriented, sitting comfortably  Psych: normal mood and affect  Abdomen: soft, non-tender, no masses appreciated.  Incision:  well healed and dry and intact       A/P:  33 year old  6 weeks postop s/p RLTCS c/b PPH and cervical extension. Here for postpartum visit. Doing well, no concerns    Postpartum care  Delivery: RCS c/b PPH and extension into the cervix. Discussed recommendations for pregnancy spacing, serial CL, and delivery at 38w in future pregnancies.   Contraception: NFP, condoms  Feeding: breast and bottle  Mood: no concerns  Incision: well healed    Routine health maintenance  - pap NILM, HPV neg (2023)  - s/p flu shot     Staffed with Dr. Juan Hernandes MD  OB/GYN PGY-4  2023 3:57 PM    Patient was seen by the resident in [facility].  I reviewed the history & exam.  The patient's assessment and plan were made jointly.    Lena Martinez MD MPH                "

## 2023-09-21 LAB
PATH REPORT.COMMENTS IMP SPEC: NORMAL
PATH REPORT.COMMENTS IMP SPEC: NORMAL
PATH REPORT.FINAL DX SPEC: NORMAL
PATH REPORT.GROSS SPEC: NORMAL
PATH REPORT.MICROSCOPIC SPEC OTHER STN: NORMAL
PATH REPORT.RELEVANT HX SPEC: NORMAL
PHOTO IMAGE: NORMAL

## 2023-09-21 PROCEDURE — 88307 TISSUE EXAM BY PATHOLOGIST: CPT | Mod: 26 | Performed by: PATHOLOGY

## 2023-09-21 NOTE — TELEPHONE ENCOUNTER
Pt seen by Dr. Benavides in ophthalmology    Will forward to Dr. Benavides to review recommendations for f/u    -- ED note from today not signed off    Ilan New RN 2:49 PM 02/10/23          [facility]    Phone Message    May a detailed message be left on voicemail: yes     Reason for Call: Other: Pt was seen at the [facility] ED today for dry eyes and needs a f/u Appt for this. Sending TE per protocols. Please call pt to discuss and schedule. Thank you.     Action Taken: Message routed to:  [facility] EYE    Travel Screening: Not Applicable                                                                         Medical Decision Making

## 2023-12-31 ENCOUNTER — HEALTH MAINTENANCE LETTER (OUTPATIENT)
Age: 33
End: 2023-12-31

## 2025-01-19 ENCOUNTER — HEALTH MAINTENANCE LETTER (OUTPATIENT)
Age: 35
End: 2025-01-19

## (undated) DEVICE — GLOVE PROTEXIS BLUE W/NEU-THERA 6.5  2D73EB65

## (undated) DEVICE — STRAP KNEE/BODY 31143004

## (undated) DEVICE — ESU GROUND PAD UNIVERSAL W/O CORD

## (undated) DEVICE — CATH TRAY FOLEY 16FR BARDEX W/DRAIN BAG STATLOCK 300316A

## (undated) DEVICE — SOL NACL 0.9% IRRIG 1000ML BOTTLE 07138-09

## (undated) DEVICE — PREP CHLORAPREP 26ML TINTED ORANGE  260815

## (undated) DEVICE — STOCKING SLEEVE COMPRESSION CALF LG

## (undated) DEVICE — SU MONOCRYL 4-0 PS-2 18" UND Y496G

## (undated) DEVICE — GLOVE ESTEEM POWDER FREE SMT 6.5  2D72PT65

## (undated) DEVICE — RX SURGIFLO HEMOSTATIC MATRIX W/THROMBIN 8ML 2994

## (undated) DEVICE — TUBING SUCTION MEDI-VAC 1/4"X20' N620A

## (undated) DEVICE — SU MONOCRYL 0 CT-1 36" Y346H

## (undated) DEVICE — PACK C-SECTION LF PL15OTA83B

## (undated) DEVICE — SU VICRYL 3-0 SH 27" J316H

## (undated) DEVICE — SUCTION TIP YANKAUER W/O VENT K86

## (undated) DEVICE — SU VICRYL 3-0 CTX 36" UND J980H

## (undated) DEVICE — SU VICRYL 0 CT-1 36" J346H

## (undated) DEVICE — SOL WATER IRRIG 1000ML BOTTLE 07139-09

## (undated) RX ORDER — ONDANSETRON 2 MG/ML
INJECTION INTRAMUSCULAR; INTRAVENOUS
Status: DISPENSED
Start: 2023-08-09

## (undated) RX ORDER — FENTANYL CITRATE-0.9 % NACL/PF 10 MCG/ML
PLASTIC BAG, INJECTION (ML) INTRAVENOUS
Status: DISPENSED
Start: 2023-08-09

## (undated) RX ORDER — BUPIVACAINE HYDROCHLORIDE 2.5 MG/ML
INJECTION, SOLUTION EPIDURAL; INFILTRATION; INTRACAUDAL
Status: DISPENSED
Start: 2023-08-09

## (undated) RX ORDER — FENTANYL CITRATE 50 UG/ML
INJECTION, SOLUTION INTRAMUSCULAR; INTRAVENOUS
Status: DISPENSED
Start: 2023-08-09

## (undated) RX ORDER — PROPOFOL 10 MG/ML
INJECTION, EMULSION INTRAVENOUS
Status: DISPENSED
Start: 2023-08-09

## (undated) RX ORDER — CEFAZOLIN SODIUM 1 G/3ML
INJECTION, POWDER, FOR SOLUTION INTRAMUSCULAR; INTRAVENOUS
Status: DISPENSED
Start: 2023-08-09

## (undated) RX ORDER — OXYTOCIN/0.9 % SODIUM CHLORIDE 30/500 ML
PLASTIC BAG, INJECTION (ML) INTRAVENOUS
Status: DISPENSED
Start: 2023-08-09

## (undated) RX ORDER — PHENYLEPHRINE HCL IN 0.9% NACL 50MG/250ML
PLASTIC BAG, INJECTION (ML) INTRAVENOUS
Status: DISPENSED
Start: 2023-08-09